# Patient Record
Sex: FEMALE | Race: WHITE | NOT HISPANIC OR LATINO | Employment: UNEMPLOYED | RURAL
[De-identification: names, ages, dates, MRNs, and addresses within clinical notes are randomized per-mention and may not be internally consistent; named-entity substitution may affect disease eponyms.]

---

## 2017-10-17 LAB
BCS RECOMMENDATION EXT: NORMAL
HM MAMMOGRAM: NORMAL

## 2017-10-19 LAB — PAP RECOMMENDATION EXT: NORMAL

## 2020-07-21 ENCOUNTER — HISTORICAL (OUTPATIENT)
Dept: ADMINISTRATIVE | Facility: HOSPITAL | Age: 55
End: 2020-07-21

## 2020-07-21 LAB — SARS-COV+SARS-COV-2 AG RESP QL IA.RAPID: NEGATIVE

## 2021-08-13 RX ORDER — NITROFURANTOIN 25; 75 MG/1; MG/1
100 CAPSULE ORAL 2 TIMES DAILY
Qty: 10 CAPSULE | Refills: 0 | Status: SHIPPED | OUTPATIENT
Start: 2021-08-13 | End: 2021-08-30 | Stop reason: ALTCHOICE

## 2021-08-30 ENCOUNTER — OFFICE VISIT (OUTPATIENT)
Dept: FAMILY MEDICINE | Facility: CLINIC | Age: 56
End: 2021-08-30
Payer: COMMERCIAL

## 2021-08-30 VITALS
TEMPERATURE: 97 F | DIASTOLIC BLOOD PRESSURE: 90 MMHG | OXYGEN SATURATION: 97 % | HEART RATE: 96 BPM | RESPIRATION RATE: 18 BRPM | WEIGHT: 238 LBS | SYSTOLIC BLOOD PRESSURE: 120 MMHG

## 2021-08-30 DIAGNOSIS — I10 ESSENTIAL HYPERTENSION, BENIGN: ICD-10-CM

## 2021-08-30 DIAGNOSIS — N39.0 URINARY TRACT INFECTION WITHOUT HEMATURIA, SITE UNSPECIFIED: Primary | ICD-10-CM

## 2021-08-30 DIAGNOSIS — R30.0 DYSURIA: ICD-10-CM

## 2021-08-30 LAB
BILIRUB SERPL-MCNC: ABNORMAL MG/DL
BLOOD URINE, POC: ABNORMAL
COLOR, POC UA: ABNORMAL
GLUCOSE UR QL STRIP: ABNORMAL
KETONES UR QL STRIP: ABNORMAL
LEUKOCYTE ESTERASE URINE, POC: ABNORMAL
NITRITE, POC UA: POSITIVE
PH, POC UA: 6
PROTEIN, POC: ABNORMAL
SPECIFIC GRAVITY, POC UA: 1.02
UROBILINOGEN, POC UA: 0.2

## 2021-08-30 PROCEDURE — 81003 URINALYSIS AUTO W/O SCOPE: CPT | Mod: QW,,, | Performed by: NURSE PRACTITIONER

## 2021-08-30 PROCEDURE — 81003 POCT URINALYSIS W/O SCOPE: ICD-10-PCS | Mod: QW,,, | Performed by: NURSE PRACTITIONER

## 2021-08-30 PROCEDURE — 87077 CULTURE, URINE: ICD-10-PCS | Mod: ,,, | Performed by: CLINICAL MEDICAL LABORATORY

## 2021-08-30 PROCEDURE — 99213 OFFICE O/P EST LOW 20 MIN: CPT | Mod: ,,, | Performed by: NURSE PRACTITIONER

## 2021-08-30 PROCEDURE — 87086 URINE CULTURE/COLONY COUNT: CPT | Mod: ,,, | Performed by: CLINICAL MEDICAL LABORATORY

## 2021-08-30 PROCEDURE — 87186 SC STD MICRODIL/AGAR DIL: CPT | Mod: ,,, | Performed by: CLINICAL MEDICAL LABORATORY

## 2021-08-30 PROCEDURE — 99213 PR OFFICE/OUTPT VISIT, EST, LEVL III, 20-29 MIN: ICD-10-PCS | Mod: ,,, | Performed by: NURSE PRACTITIONER

## 2021-08-30 PROCEDURE — 87086 CULTURE, URINE: ICD-10-PCS | Mod: ,,, | Performed by: CLINICAL MEDICAL LABORATORY

## 2021-08-30 PROCEDURE — 87077 CULTURE AEROBIC IDENTIFY: CPT | Mod: ,,, | Performed by: CLINICAL MEDICAL LABORATORY

## 2021-08-30 PROCEDURE — 87186 CULTURE, URINE: ICD-10-PCS | Mod: ,,, | Performed by: CLINICAL MEDICAL LABORATORY

## 2021-08-30 RX ORDER — FLUCONAZOLE 150 MG/1
150 TABLET ORAL DAILY
Qty: 3 TABLET | Refills: 0 | Status: SHIPPED | OUTPATIENT
Start: 2021-08-30 | End: 2021-08-31

## 2021-08-30 RX ORDER — CIPROFLOXACIN 500 MG/1
500 TABLET ORAL EVERY 12 HOURS
Qty: 10 TABLET | Refills: 0 | Status: SHIPPED | OUTPATIENT
Start: 2021-08-30 | End: 2022-05-10 | Stop reason: ALTCHOICE

## 2021-08-30 RX ORDER — PHENAZOPYRIDINE HYDROCHLORIDE 200 MG/1
200 TABLET, FILM COATED ORAL 3 TIMES DAILY PRN
Qty: 30 TABLET | Refills: 0 | Status: SHIPPED | OUTPATIENT
Start: 2021-08-30 | End: 2021-09-09

## 2021-09-01 LAB — UA COMPLETE W REFLEX CULTURE PNL UR: ABNORMAL

## 2022-05-10 ENCOUNTER — OFFICE VISIT (OUTPATIENT)
Dept: FAMILY MEDICINE | Facility: CLINIC | Age: 57
End: 2022-05-10
Payer: COMMERCIAL

## 2022-05-10 VITALS
TEMPERATURE: 98 F | SYSTOLIC BLOOD PRESSURE: 140 MMHG | BODY MASS INDEX: 36.46 KG/M2 | HEART RATE: 81 BPM | OXYGEN SATURATION: 96 % | RESPIRATION RATE: 18 BRPM | HEIGHT: 69 IN | WEIGHT: 246.19 LBS | DIASTOLIC BLOOD PRESSURE: 90 MMHG

## 2022-05-10 DIAGNOSIS — I10 ESSENTIAL HYPERTENSION, BENIGN: ICD-10-CM

## 2022-05-10 DIAGNOSIS — H81.11 VERTIGO, BENIGN PAROXYSMAL, RIGHT: Primary | ICD-10-CM

## 2022-05-10 PROCEDURE — 1160F PR REVIEW ALL MEDS BY PRESCRIBER/CLIN PHARMACIST DOCUMENTED: ICD-10-PCS | Mod: CPTII,,, | Performed by: NURSE PRACTITIONER

## 2022-05-10 PROCEDURE — 3080F PR MOST RECENT DIASTOLIC BLOOD PRESSURE >= 90 MM HG: ICD-10-PCS | Mod: CPTII,,, | Performed by: NURSE PRACTITIONER

## 2022-05-10 PROCEDURE — 96372 PR INJECTION,THERAP/PROPH/DIAG2ST, IM OR SUBCUT: ICD-10-PCS | Mod: ,,, | Performed by: NURSE PRACTITIONER

## 2022-05-10 PROCEDURE — 1159F PR MEDICATION LIST DOCUMENTED IN MEDICAL RECORD: ICD-10-PCS | Mod: CPTII,,, | Performed by: NURSE PRACTITIONER

## 2022-05-10 PROCEDURE — 3077F SYST BP >= 140 MM HG: CPT | Mod: CPTII,,, | Performed by: NURSE PRACTITIONER

## 2022-05-10 PROCEDURE — 1160F RVW MEDS BY RX/DR IN RCRD: CPT | Mod: CPTII,,, | Performed by: NURSE PRACTITIONER

## 2022-05-10 PROCEDURE — 4010F ACE/ARB THERAPY RXD/TAKEN: CPT | Mod: CPTII,,, | Performed by: NURSE PRACTITIONER

## 2022-05-10 PROCEDURE — 3008F PR BODY MASS INDEX (BMI) DOCUMENTED: ICD-10-PCS | Mod: CPTII,,, | Performed by: NURSE PRACTITIONER

## 2022-05-10 PROCEDURE — 3077F PR MOST RECENT SYSTOLIC BLOOD PRESSURE >= 140 MM HG: ICD-10-PCS | Mod: CPTII,,, | Performed by: NURSE PRACTITIONER

## 2022-05-10 PROCEDURE — 99213 OFFICE O/P EST LOW 20 MIN: CPT | Mod: 25,,, | Performed by: NURSE PRACTITIONER

## 2022-05-10 PROCEDURE — 4010F PR ACE/ARB THEARPY RXD/TAKEN: ICD-10-PCS | Mod: CPTII,,, | Performed by: NURSE PRACTITIONER

## 2022-05-10 PROCEDURE — 99213 PR OFFICE/OUTPT VISIT, EST, LEVL III, 20-29 MIN: ICD-10-PCS | Mod: 25,,, | Performed by: NURSE PRACTITIONER

## 2022-05-10 PROCEDURE — 3008F BODY MASS INDEX DOCD: CPT | Mod: CPTII,,, | Performed by: NURSE PRACTITIONER

## 2022-05-10 PROCEDURE — 3080F DIAST BP >= 90 MM HG: CPT | Mod: CPTII,,, | Performed by: NURSE PRACTITIONER

## 2022-05-10 PROCEDURE — 96372 THER/PROPH/DIAG INJ SC/IM: CPT | Mod: ,,, | Performed by: NURSE PRACTITIONER

## 2022-05-10 PROCEDURE — 1159F MED LIST DOCD IN RCRD: CPT | Mod: CPTII,,, | Performed by: NURSE PRACTITIONER

## 2022-05-10 RX ORDER — TELMISARTAN 20 MG/1
20 TABLET ORAL DAILY
Qty: 30 TABLET | Refills: 0 | Status: SHIPPED | OUTPATIENT
Start: 2022-05-10 | End: 2022-09-02

## 2022-05-10 RX ORDER — FLUTICASONE PROPIONATE 50 MCG
1 SPRAY, SUSPENSION (ML) NASAL 2 TIMES DAILY
Qty: 9.9 ML | Refills: 0 | Status: SHIPPED | OUTPATIENT
Start: 2022-05-10 | End: 2023-12-20

## 2022-05-10 RX ORDER — OMEPRAZOLE 20 MG/1
20 CAPSULE, DELAYED RELEASE ORAL DAILY
COMMUNITY

## 2022-05-10 RX ORDER — DEXAMETHASONE SODIUM PHOSPHATE 4 MG/ML
4 INJECTION, SOLUTION INTRA-ARTICULAR; INTRALESIONAL; INTRAMUSCULAR; INTRAVENOUS; SOFT TISSUE
Status: COMPLETED | OUTPATIENT
Start: 2022-05-10 | End: 2022-05-10

## 2022-05-10 RX ORDER — PREDNISONE 10 MG/1
TABLET ORAL
Qty: 18 TABLET | Refills: 0 | Status: SHIPPED | OUTPATIENT
Start: 2022-05-10 | End: 2022-05-19

## 2022-05-10 RX ADMIN — DEXAMETHASONE SODIUM PHOSPHATE 4 MG: 4 INJECTION, SOLUTION INTRA-ARTICULAR; INTRALESIONAL; INTRAMUSCULAR; INTRAVENOUS; SOFT TISSUE at 10:05

## 2022-05-10 NOTE — PROGRESS NOTES
KELLY Wren   [facility]  [address]  [place] [address]     PATIENT NAME: Isamar Osorio  : 1965  DATE: 5/10/22  MRN: [id number]      Billing Provider: KELLY Wren  Level of Service: [place] OFFICE/OUTPT VISIT, EST, LEVL III, 20-29 MIN  Patient PCP Information     Provider PCP Type    KELLY Wren General          Reason for Visit / Chief Complaint: Ear Fullness (Right ear worse than left for the last 3 weeks.  If she sleeps on the right side, it feels clogged and moisture will be inside ear.  Has experienced some dizziness if she gets up to fast and had some nausea.)       Update PCP  Update Chief Complaint         History of Present Illness / Problem Focused Workflow     Isamar Osorio presents to the clinic c/o ears feeling full lamonte the right for the past 3 weeks and has had dizziness at times when she gets up quick.      Review of Systems     Review of Systems   Constitutional: Negative.  Negative for activity change, appetite change, chills, fatigue and fever.   HENT: Positive for ear discharge. Negative for nasal congestion, ear pain, postnasal drip, sinus pressure/congestion, sneezing, sore throat and trouble swallowing.         Right ear feels stopped up   Eyes: Negative for visual disturbance.   Respiratory: Negative for cough, chest tightness and shortness of breath.    Cardiovascular: Negative for chest pain, palpitations and leg swelling.   Gastrointestinal: Negative for abdominal pain, blood in stool, change in bowel habit, nausea, vomiting and change in bowel habit.   Endocrine: Negative for cold intolerance, heat intolerance, polydipsia, polyphagia and polyuria.   Genitourinary: Negative for frequency and urgency.   Musculoskeletal: Negative for neck pain.   Neurological: Positive for dizziness. Negative for weakness, numbness and headaches.        Medical / Social / Family History   History reviewed. No pertinent past medical history.    Past Surgical  "History:   Procedure Laterality Date    ANKLE SURGERY Left      SECTION      CHOLECYSTECTOMY         Social History  Ms.  reports that she has never smoked. She has never used smokeless tobacco. She reports that she does not drink alcohol and does not use drugs.    Family History  Ms.'s family history includes No Known Problems in her father and mother.    Medications and Allergies     Medications  Outpatient Medications Marked as Taking for the 5/10/22 encounter (Office Visit) with KELLY Goldstein   Medication Sig Dispense Refill    omeprazole (PRILOSEC) 20 MG capsule Take 20 mg by mouth once daily.       Current Facility-Administered Medications for the 5/10/22 encounter (Office Visit) with KELLY Goldstein   Medication Dose Route Frequency Provider Last Rate Last Admin    [COMPLETED] dexamethasone injection 4 mg  4 mg Intramuscular 1 time in Clinic/HOD KELLY Goldstein   4 mg at 05/10/22 1009       Allergies  Review of patient's allergies indicates:  No Known Allergies    Physical Examination     Vitals:    05/10/22 0930 05/10/22 1039   BP: (!) 144/100 (!) 140/90   BP Location: Right arm    Patient Position: Sitting    BP Method: Large (Manual)    Pulse: 81    Resp: 18    Temp: 98.2 °F (36.8 °C)    TempSrc: Oral    SpO2: 96%    Weight: 111.7 kg (246 lb 3.2 oz)    Height: 5' 8.5" (1.74 m)       Physical Exam  Constitutional:       General: She is not in acute distress.     Appearance: Normal appearance.   HENT:      Right Ear: No tenderness. A middle ear effusion is present. There is no impacted cerumen. Tympanic membrane is not injected, perforated, erythematous, retracted or bulging.      Left Ear: No tenderness. A middle ear effusion is present. There is no impacted cerumen. Tympanic membrane is not injected, erythematous, retracted or bulging.      Ears:      Comments: Moderate amt of clear fluid behind TM right ear and small amt behind left TM     Nose: No congestion or " rhinorrhea.      Right Turbinates: Not swollen.      Left Turbinates: Not swollen.      Right Sinus: No maxillary sinus tenderness.      Left Sinus: No maxillary sinus tenderness.      Mouth/Throat:      Mouth: Mucous membranes are moist.      Pharynx: Oropharynx is clear.   Eyes:      General: No visual field deficit.     Extraocular Movements: Extraocular movements intact.      Right eye: No nystagmus.      Left eye: No nystagmus.      Conjunctiva/sclera: Conjunctivae normal.   Neck:      Thyroid: No thyromegaly.      Vascular: No carotid bruit.   Cardiovascular:      Rate and Rhythm: Normal rate and regular rhythm.      Pulses: Normal pulses.      Heart sounds: Normal heart sounds. No murmur heard.  Pulmonary:      Effort: Pulmonary effort is normal. No accessory muscle usage or respiratory distress.      Breath sounds: Normal breath sounds. No wheezing, rhonchi or rales.   Abdominal:      Palpations: Abdomen is soft.   Musculoskeletal:         General: Normal range of motion.      Cervical back: Neck supple.   Lymphadenopathy:      Cervical: Cervical adenopathy present.   Skin:     General: Skin is warm and dry.      Coloration: Skin is not jaundiced or pale.   Neurological:      Mental Status: She is alert and oriented to person, place, and time.      Cranial Nerves: No facial asymmetry.      Coordination: Coordination is intact.      Gait: Gait is intact.          Assessment and Plan (including Health Maintenance)      Problem List  Smart Sets  Document Outside HM   :        Health Maintenance Due   Topic Date Due    Cervical Cancer Screening  Never done    Lipid Panel  Never done    COVID-19 Vaccine (1) Never done    TETANUS VACCINE  Never done    Mammogram  Never done    Colorectal Cancer Screening  Never done    Shingles Vaccine (1 of 2) Never done       Problem List Items Addressed This Visit    None     Visit Diagnoses     Vertigo, benign paroxysmal, right    -  Primary    Will treat with decadron  injection along with prednisone taper and flonase nasal spray twice daily.    Relevant Medications    fluticasone propionate (FLONASE) 50 mcg/actuation nasal spray    predniSONE (DELTASONE) 10 MG tablet    dexamethasone injection 4 mg (Completed)    Essential hypertension, benign        Will restart telmisartan until pt can make an appt with her cardiologist.    Relevant Medications    telmisartan (MICARDIS) 20 MG Tab        Vertigo, benign paroxysmal, right  Comments:  Will treat with decadron injection along with prednisone taper and flonase nasal spray twice daily.  Orders:  -     fluticasone propionate (FLONASE) 50 mcg/actuation nasal spray; 1 spray (50 mcg total) by Each Nostril route 2 (two) times a day.  Dispense: 9.9 mL; Refill: 0  -     predniSONE (DELTASONE) 10 MG tablet; Take 1 tablet (10 mg total) by mouth 3 (three) times daily for 3 days, THEN 1 tablet (10 mg total) 2 (two) times daily for 3 days, THEN 1 tablet (10 mg total) once daily for 3 days.  Dispense: 18 tablet; Refill: 0  -     dexamethasone injection 4 mg    Essential hypertension, benign  Comments:  Will restart telmisartan until pt can make an appt with her cardiologist.  Orders:  -     telmisartan (MICARDIS) 20 MG Tab; Take 1 tablet (20 mg total) by mouth once daily.  Dispense: 30 tablet; Refill: 0       Health Maintenance Topics with due status: Not Due       Topic Last Completion Date    Influenza Vaccine Not Due       Procedures         Follow up in about 1 week (around 5/17/2022), or if symptoms worsen or fail to improve.     Signature:  KELLY Wren    Date of encounter: 5/10/22

## 2022-09-02 DIAGNOSIS — I10 ESSENTIAL HYPERTENSION, BENIGN: ICD-10-CM

## 2022-09-02 DIAGNOSIS — I10 ESSENTIAL HYPERTENSION, BENIGN: Primary | ICD-10-CM

## 2022-09-02 RX ORDER — TELMISARTAN 20 MG/1
TABLET ORAL
Qty: 30 TABLET | Refills: 0 | Status: SHIPPED | OUTPATIENT
Start: 2022-09-02 | End: 2022-12-15 | Stop reason: SDUPTHER

## 2022-09-02 RX ORDER — TELMISARTAN 20 MG/1
20 TABLET ORAL DAILY
Qty: 30 TABLET | Refills: 0 | Status: CANCELLED | OUTPATIENT
Start: 2022-09-02 | End: 2022-10-02

## 2022-12-15 ENCOUNTER — OFFICE VISIT (OUTPATIENT)
Dept: FAMILY MEDICINE | Facility: CLINIC | Age: 57
End: 2022-12-15
Payer: COMMERCIAL

## 2022-12-15 VITALS
TEMPERATURE: 98 F | BODY MASS INDEX: 36.29 KG/M2 | HEART RATE: 91 BPM | HEIGHT: 69 IN | OXYGEN SATURATION: 98 % | RESPIRATION RATE: 18 BRPM | SYSTOLIC BLOOD PRESSURE: 138 MMHG | WEIGHT: 245 LBS | DIASTOLIC BLOOD PRESSURE: 90 MMHG

## 2022-12-15 DIAGNOSIS — I10 ESSENTIAL HYPERTENSION, BENIGN: ICD-10-CM

## 2022-12-15 DIAGNOSIS — J02.9 PHARYNGITIS, UNSPECIFIED ETIOLOGY: Primary | ICD-10-CM

## 2022-12-15 PROCEDURE — 4010F ACE/ARB THERAPY RXD/TAKEN: CPT | Mod: CPTII,,, | Performed by: NURSE PRACTITIONER

## 2022-12-15 PROCEDURE — 99213 PR OFFICE/OUTPT VISIT, EST, LEVL III, 20-29 MIN: ICD-10-PCS | Mod: 25,,, | Performed by: NURSE PRACTITIONER

## 2022-12-15 PROCEDURE — 3008F PR BODY MASS INDEX (BMI) DOCUMENTED: ICD-10-PCS | Mod: CPTII,,, | Performed by: NURSE PRACTITIONER

## 2022-12-15 PROCEDURE — 4010F PR ACE/ARB THEARPY RXD/TAKEN: ICD-10-PCS | Mod: CPTII,,, | Performed by: NURSE PRACTITIONER

## 2022-12-15 PROCEDURE — 1160F PR REVIEW ALL MEDS BY PRESCRIBER/CLIN PHARMACIST DOCUMENTED: ICD-10-PCS | Mod: CPTII,,, | Performed by: NURSE PRACTITIONER

## 2022-12-15 PROCEDURE — 1159F PR MEDICATION LIST DOCUMENTED IN MEDICAL RECORD: ICD-10-PCS | Mod: CPTII,,, | Performed by: NURSE PRACTITIONER

## 2022-12-15 PROCEDURE — 1160F RVW MEDS BY RX/DR IN RCRD: CPT | Mod: CPTII,,, | Performed by: NURSE PRACTITIONER

## 2022-12-15 PROCEDURE — 1159F MED LIST DOCD IN RCRD: CPT | Mod: CPTII,,, | Performed by: NURSE PRACTITIONER

## 2022-12-15 PROCEDURE — 3080F PR MOST RECENT DIASTOLIC BLOOD PRESSURE >= 90 MM HG: ICD-10-PCS | Mod: CPTII,,, | Performed by: NURSE PRACTITIONER

## 2022-12-15 PROCEDURE — 96372 THER/PROPH/DIAG INJ SC/IM: CPT | Mod: ,,, | Performed by: NURSE PRACTITIONER

## 2022-12-15 PROCEDURE — 99213 OFFICE O/P EST LOW 20 MIN: CPT | Mod: 25,,, | Performed by: NURSE PRACTITIONER

## 2022-12-15 PROCEDURE — 3075F SYST BP GE 130 - 139MM HG: CPT | Mod: CPTII,,, | Performed by: NURSE PRACTITIONER

## 2022-12-15 PROCEDURE — 3080F DIAST BP >= 90 MM HG: CPT | Mod: CPTII,,, | Performed by: NURSE PRACTITIONER

## 2022-12-15 PROCEDURE — 96372 PR INJECTION,THERAP/PROPH/DIAG2ST, IM OR SUBCUT: ICD-10-PCS | Mod: ,,, | Performed by: NURSE PRACTITIONER

## 2022-12-15 PROCEDURE — 3075F PR MOST RECENT SYSTOLIC BLOOD PRESS GE 130-139MM HG: ICD-10-PCS | Mod: CPTII,,, | Performed by: NURSE PRACTITIONER

## 2022-12-15 PROCEDURE — 3008F BODY MASS INDEX DOCD: CPT | Mod: CPTII,,, | Performed by: NURSE PRACTITIONER

## 2022-12-15 RX ORDER — TELMISARTAN 20 MG/1
20 TABLET ORAL DAILY
Qty: 30 TABLET | Refills: 3 | Status: SHIPPED | OUTPATIENT
Start: 2022-12-15 | End: 2023-05-03

## 2022-12-15 NOTE — ASSESSMENT & PLAN NOTE
Declines swab. Bicillin LA given IM in clinic.   Ed a Hist as needed for nasal congestion and drainage.      Increase fluids and rest, alternate OTC Tylenol/Motrin for fever/pain, and to follow up if discussed worsening symptoms occur in next 2-3 days for further evaluation. May use warm, salt gargles, honey lemon drops, etc as needed. Patient verbalized understanding of treatment plan and denies any questions.

## 2022-12-15 NOTE — ASSESSMENT & PLAN NOTE
BP elevated today. Patient admits she has not been taking Micardis. Resume Micardis. Follow up with Cardiology as scheduled.

## 2022-12-15 NOTE — PROGRESS NOTES
Danielle Harkins NP   [facility]  [address]  [place]  [address]      PATIENT NAME: Isamar Osorio  : 1965  DATE: 12/15/22  MRN: [id number]      Billing Provider: Danielle Harkins NP  Level of Service: ME OFFICE/OUTPT VISIT, EST, LEVL III, 20-29 MIN  Patient PCP Information       Provider PCP Type    KELLY Wren General            Reason for Visit / Chief Complaint: Sore Throat (X 1 day)       Update PCP  Update Chief Complaint         History of Present Illness / Problem Focused Workflow     Isamar Osorio presents to the clinic with Sore Throat (X 1 day)     57 year old female presents to clinic with complaints of sore throat that started yesterday. She states she has had a little sinus drainage and congestion and mild cough. Denies fever. Denies known sick contacts. She states she has strep often. Denies other complaints at today's visit.       Review of Systems     @Review of Systems   Constitutional:  Negative for activity change, appetite change, fatigue and fever.   HENT:  Positive for nasal congestion, rhinorrhea, sinus pressure/congestion and sore throat. Negative for ear pain.    Eyes:  Negative for pain, redness, visual disturbance and eye dryness.   Respiratory:  Positive for cough. Negative for shortness of breath.    Cardiovascular:  Negative for chest pain and leg swelling.   Gastrointestinal:  Negative for abdominal distention, abdominal pain, constipation and diarrhea.   Endocrine: Negative for cold intolerance, heat intolerance and polyuria.   Genitourinary:  Negative for bladder incontinence, dysuria, frequency and urgency.   Musculoskeletal:  Negative for arthralgias, gait problem and myalgias.   Integumentary:  Negative for color change, rash and wound.   Allergic/Immunologic: Negative for environmental allergies and food allergies.   Neurological:  Negative for dizziness, weakness, light-headedness and headaches.   Psychiatric/Behavioral:  Negative for  behavioral problems and sleep disturbance.      Medical / Social / Family History   History reviewed. No pertinent past medical history.    Past Surgical History:   Procedure Laterality Date    ANKLE SURGERY Left      SECTION      CHOLECYSTECTOMY      PARTIAL HYSTERECTOMY         Social History  Ms.  reports that she has never smoked. She has never been exposed to tobacco smoke. She has never used smokeless tobacco. She reports that she does not drink alcohol and does not use drugs.    Family History  Ms.'s family history includes No Known Problems in her father and mother.    Medications and Allergies     Medications  No outpatient medications have been marked as taking for the 12/15/22 encounter (Office Visit) with Danielle Harkins NP.     Current Facility-Administered Medications for the 12/15/22 encounter (Office Visit) with Danielle Harkins NP   Medication Dose Route Frequency Provider Last Rate Last Admin    [COMPLETED] penicillin G benzathine (BICILLIN LA) injection 1.2 Million Units  1.2 Million Units Intramuscular Once Danielle Harkins NP   1.2 Million Units at 12/15/22 1005    [DISCONTINUED] penicillin G procaine-penicillin G benzathine (BICILLIN-CR) injection 1.2 Million Units  1.2 Million Units Intramuscular Once Danielle Harkins NP           Allergies  Review of patient's allergies indicates:  No Known Allergies    Physical Examination     Vitals:    12/15/22 0915   BP: (!) 138/90   Pulse:    Resp:    Temp:      Physical Exam  Vitals and nursing note reviewed.   Constitutional:       Appearance: Normal appearance.   HENT:      Head: Normocephalic.      Right Ear: Tympanic membrane normal.      Left Ear: Tympanic membrane normal.      Nose: Congestion present.      Right Turbinates: Pale.      Mouth/Throat:      Lips: Pink.      Mouth: Mucous membranes are moist.      Pharynx: Oropharyngeal exudate (clear post nasal drainage.) and posterior oropharyngeal erythema present.      Tonsils:  Tonsillar exudate present. 2+ on the right. 2+ on the left.   Eyes:      Conjunctiva/sclera: Conjunctivae normal.   Cardiovascular:      Rate and Rhythm: Normal rate and regular rhythm.      Pulses: Normal pulses.      Heart sounds: Normal heart sounds.   Pulmonary:      Effort: Pulmonary effort is normal.      Breath sounds: Normal breath sounds. No wheezing or rhonchi.   Abdominal:      General: Abdomen is flat. Bowel sounds are normal. There is no distension.      Palpations: Abdomen is soft.      Tenderness: There is no abdominal tenderness.   Musculoskeletal:         General: Normal range of motion.      Cervical back: Normal range of motion.   Lymphadenopathy:      Cervical: Cervical adenopathy present.   Skin:     General: Skin is warm and dry.      Capillary Refill: Capillary refill takes less than 2 seconds.   Neurological:      General: No focal deficit present.      Mental Status: She is alert and oriented to person, place, and time. Mental status is at baseline.   Psychiatric:         Mood and Affect: Mood normal.         Behavior: Behavior normal.             No results found for: WBC, HGB, HCT, MCV, PLT       No results found for: NA, K, CL, CO2, GLU, BUN, CREATININE, CALCIUM, PROT, ALBUMIN, BILITOT, ALKPHOS, AST, ALT, ANIONGAP, ESTGFRAFRICA, EGFRNONAA   No image results found.     Procedures   Assessment and Plan (including Health Maintenance)      Problem List  Smart Sets  Document Outside HM   :    Plan:           Problem List Items Addressed This Visit          ENT    Pharyngitis - Primary    Current Assessment & Plan     Declines swab. Bicillin LA given IM in clinic.   Ed a Hist as needed for nasal congestion and drainage.      Increase fluids and rest, alternate OTC Tylenol/Motrin for fever/pain, and to follow up if discussed worsening symptoms occur in next 2-3 days for further evaluation. May use warm, salt gargles, honey lemon drops, etc as needed. Patient verbalized understanding of treatment  plan and denies any questions.         Relevant Medications    chlorpheniramine-phenylephrine 4-10 mg per tablet    penicillin G benzathine (BICILLIN LA) injection 1.2 Million Units (Completed)       Cardiac/Vascular    Essential hypertension, benign    Overview     Will restart telmisartan until pt can make an appt with her cardiologist.         Current Assessment & Plan     BP elevated today. Patient admits she has not been taking Micardis. Resume Micardis. Follow up with Cardiology as scheduled.          Relevant Medications    telmisartan (MICARDIS) 20 MG Tab       Health Maintenance Topics with due status: Not Due       Topic Last Completion Date    Mammogram Not Due       No future appointments.     Health Maintenance Due   Topic Date Due    Cervical Cancer Screening  Never done    Lipid Panel  Never done    COVID-19 Vaccine (1) Never done    TETANUS VACCINE  Never done    Colorectal Cancer Screening  Never done    Shingles Vaccine (1 of 2) Never done        Follow up in about 4 weeks (around 1/12/2023), or if symptoms worsen or fail to improve.     Signature:  Danielle Harkins NP  [facility]  [address] [place]  [address]    Date of encounter: 12/15/22

## 2022-12-20 RX ORDER — AZITHROMYCIN 250 MG/1
TABLET, FILM COATED ORAL
Qty: 6 TABLET | Refills: 0 | Status: SHIPPED | OUTPATIENT
Start: 2022-12-20 | End: 2022-12-25

## 2023-01-30 ENCOUNTER — PATIENT OUTREACH (OUTPATIENT)
Dept: ADMINISTRATIVE | Facility: HOSPITAL | Age: 58
End: 2023-01-30

## 2023-05-03 ENCOUNTER — OFFICE VISIT (OUTPATIENT)
Dept: FAMILY MEDICINE | Facility: CLINIC | Age: 58
End: 2023-05-03
Payer: OTHER GOVERNMENT

## 2023-05-03 VITALS
TEMPERATURE: 98 F | DIASTOLIC BLOOD PRESSURE: 92 MMHG | HEART RATE: 84 BPM | RESPIRATION RATE: 17 BRPM | HEIGHT: 69 IN | SYSTOLIC BLOOD PRESSURE: 134 MMHG | BODY MASS INDEX: 36.33 KG/M2 | OXYGEN SATURATION: 99 % | WEIGHT: 245.31 LBS

## 2023-05-03 DIAGNOSIS — N12 PYELONEPHRITIS: Primary | ICD-10-CM

## 2023-05-03 DIAGNOSIS — I10 ESSENTIAL HYPERTENSION, BENIGN: ICD-10-CM

## 2023-05-03 PROCEDURE — 99213 PR OFFICE/OUTPT VISIT, EST, LEVL III, 20-29 MIN: ICD-10-PCS | Mod: ,,, | Performed by: NURSE PRACTITIONER

## 2023-05-03 PROCEDURE — 99213 OFFICE O/P EST LOW 20 MIN: CPT | Mod: ,,, | Performed by: NURSE PRACTITIONER

## 2023-05-03 RX ORDER — ONDANSETRON 4 MG/1
4 TABLET, ORALLY DISINTEGRATING ORAL
COMMUNITY
Start: 2023-04-26 | End: 2023-05-03

## 2023-05-03 RX ORDER — AMOXICILLIN AND CLAVULANATE POTASSIUM 875; 125 MG/1; MG/1
1 TABLET, FILM COATED ORAL
COMMUNITY
Start: 2023-04-29 | End: 2023-05-13

## 2023-05-03 RX ORDER — HYDROCODONE BITARTRATE AND ACETAMINOPHEN 5; 325 MG/1; MG/1
1 TABLET ORAL
COMMUNITY
Start: 2023-04-26 | End: 2023-05-06

## 2023-05-03 RX ORDER — FLUCONAZOLE 150 MG/1
TABLET ORAL
Qty: 2 TABLET | Refills: 2 | Status: SHIPPED | OUTPATIENT
Start: 2023-05-03 | End: 2023-12-20

## 2023-05-03 RX ORDER — TELMISARTAN 20 MG/1
20 TABLET ORAL DAILY
Qty: 30 TABLET | Refills: 3
Start: 2023-05-03 | End: 2023-07-17 | Stop reason: SDUPTHER

## 2023-06-07 ENCOUNTER — CLINICAL SUPPORT (OUTPATIENT)
Dept: FAMILY MEDICINE | Facility: CLINIC | Age: 58
End: 2023-06-07
Payer: OTHER GOVERNMENT

## 2023-06-07 VITALS — DIASTOLIC BLOOD PRESSURE: 88 MMHG | SYSTOLIC BLOOD PRESSURE: 134 MMHG

## 2023-06-12 DIAGNOSIS — N30.91 CYSTITIS WITH HEMATURIA: Primary | ICD-10-CM

## 2023-06-12 RX ORDER — CIPROFLOXACIN 500 MG/1
500 TABLET ORAL EVERY 12 HOURS
Qty: 14 TABLET | Refills: 0 | Status: SHIPPED | OUTPATIENT
Start: 2023-06-12 | End: 2023-11-01

## 2023-06-15 ENCOUNTER — TELEPHONE (OUTPATIENT)
Dept: FAMILY MEDICINE | Facility: CLINIC | Age: 58
End: 2023-06-15
Payer: OTHER GOVERNMENT

## 2023-06-16 NOTE — PROGRESS NOTES
Danielle Harkins NP   [facility]  [address]  [place]  [address]      PATIENT NAME: Isamar Osorio  : 1965  DATE: 5/3/23  MRN: [id number]      Billing Provider: Danielle Harkins NP  Level of Service: [place] OFFICE/OUTPT VISIT, EST, LEVL III, 20-29 MIN  Patient PCP Information       Provider PCP Type    Danielle Harkins NP General            Reason for Visit / Chief Complaint: Follow-up (Hospital follow up from [facility]. Discharged on . )       Update PCP  Update Chief Complaint         History of Present Illness / Problem Focused Workflow     Isamar Osorio presents to the clinic with Follow-up (Hospital follow up from [facility]. Discharged on . )     57 year old female presents to clinic for hospital follow up visit. She was discharged from [facility] on 23 where she was treated for Pyelonephritis. She originally went in on  to ER and was sent home on Cipro. However, when her blood cultures came back positive the next day she was contacted and instructed to return for admission. She was admitted on  and received IV Vanc and Zosyn. She was discharged home on  with instructions to take oral Augmentin x14 days. She states she is feeling much better and denies any problems. She was instructed to hold her Telmisartan at hospital MT due to hypotension. However, her blood pressure has been elevated at home so we will start her back on her Telmisartan today.        Review of Systems     Review of Systems   Constitutional:  Negative for activity change, appetite change, fatigue and fever.   HENT:  Negative for nasal congestion, ear pain, rhinorrhea, sinus pressure/congestion and sore throat.    Eyes:  Negative for pain, redness, visual disturbance and eye dryness.   Respiratory:  Negative for cough and shortness of breath.    Cardiovascular:  Negative for chest pain and leg swelling.   Gastrointestinal:  Negative for abdominal distention, abdominal pain,  constipation and diarrhea.   Endocrine: Negative for cold intolerance, heat intolerance and polyuria.   Genitourinary:  Negative for bladder incontinence, dysuria, frequency and urgency.   Musculoskeletal:  Negative for arthralgias, gait problem and myalgias.   Integumentary:  Negative for color change, rash and wound.   Allergic/Immunologic: Negative for environmental allergies and food allergies.   Neurological:  Negative for dizziness, weakness, light-headedness and headaches.   Psychiatric/Behavioral:  Negative for behavioral problems and sleep disturbance.      Medical / Social / Family History   History reviewed. No pertinent past medical history.    Past Surgical History:   Procedure Laterality Date    ANKLE SURGERY Left      SECTION      CHOLECYSTECTOMY      PARTIAL HYSTERECTOMY         Social History  Ms.  reports that she has never smoked. She has never been exposed to tobacco smoke. She has never used smokeless tobacco. She reports that she does not drink alcohol and does not use drugs.    Family History  Ms.'s family history includes No Known Problems in her father and mother.    Medications and Allergies     Medications  Outpatient Medications Marked as Taking for the 5/3/23 encounter (Office Visit) with Danielle Harkins NP   Medication Sig Dispense Refill    [] amoxicillin-clavulanate 875-125mg (AUGMENTIN) 875-125 mg per tablet Take 1 tablet by mouth.      [] ondansetron (ZOFRAN-ODT) 4 MG TbDL Take 4 mg by mouth.         Allergies  Review of patient's allergies indicates:  No Known Allergies    Physical Examination     Vitals:    23 1433   BP: (!) 134/92   Pulse: 84   Resp: 17   Temp: 97.7 °F (36.5 °C)     Physical Exam  Vitals and nursing note reviewed.   Constitutional:       Appearance: She is obese.   HENT:      Head: Normocephalic.      Right Ear: Tympanic membrane normal.      Left Ear: Tympanic membrane normal.      Nose: Nose normal.      Mouth/Throat:       Mouth: Mucous membranes are moist.      Pharynx: Oropharynx is clear. No posterior oropharyngeal erythema.   Eyes:      Conjunctiva/sclera: Conjunctivae normal.   Cardiovascular:      Rate and Rhythm: Normal rate and regular rhythm.      Pulses: Normal pulses.      Heart sounds: Normal heart sounds.   Pulmonary:      Effort: Pulmonary effort is normal.      Breath sounds: Normal breath sounds.   Abdominal:      General: Abdomen is flat. Bowel sounds are normal. There is no distension.      Palpations: Abdomen is soft.   Musculoskeletal:         General: No swelling or tenderness. Normal range of motion.      Cervical back: Normal range of motion.      Right lower leg: No edema.      Left lower leg: No edema.   Skin:     General: Skin is warm and dry.      Capillary Refill: Capillary refill takes less than 2 seconds.   Neurological:      Mental Status: She is alert. Mental status is at baseline.   Psychiatric:         Mood and Affect: Mood normal.         Behavior: Behavior normal.             No results found for: WBC, HGB, HCT, MCV, PLT       No results found for: NA, K, CL, CO2, GLU, BUN, CREATININE, CALCIUM, PROT, ALBUMIN, BILITOT, ALKPHOS, AST, ALT, ANIONGAP, ESTGFRAFRICA, EGFRNONAA   No image results found.     Procedures   No results found for: CHOL  No results found for: HDL  No results found for: LDLCALC  No results found for: TRIG  No results found for: CHOLHDL   No results found for: LABA1C, HGBA1C   No results found for: TSH, W3NTJSR, V0OFCUH, THYROIDAB, FREET4   Assessment and Plan (including Health Maintenance)      Problem List  Smart Sets  Document Outside HM   :    Plan:         Health Maintenance Due   Topic Date Due    Hepatitis C Screening  Never done    Lipid Panel  Never done    COVID-19 Vaccine (1) Never done    TETANUS VACCINE  Never done    Hemoglobin A1c (Diabetic Prevention Screening)  Never done    Colorectal Cancer Screening  Never done    Shingles Vaccine (1 of 2) Never done    Mammogram   10/17/2018    Cervical Cancer Screening  10/17/2020       Problem List Items Addressed This Visit          Cardiac/Vascular    Essential hypertension, benign    Current Assessment & Plan     Micardis was held at [facility] due to hypotension. However, since that time her blood pressure has been elevated. She has been checking at home with readings around 140/high 80s. We will restart her on Micardis today and she is to continue to monitor at home and keep log. Return for BP recheck in 2 weeks.             ID    Pyelonephritis - Primary    Current Assessment & Plan     Patient is still taking oral Augmentin as instructed at [facility]. Instructed her to return to clinic for recheck of urine after Augmentin complete.   Increase fluids. Follow up if symptoms worsen.             The patient has no Health Maintenance topics of status Not Due    No future appointments.     Follow up in about 2 weeks (around 5/17/2023).     Signature:  Danielle Harkins NP  [facility]  [address]  [place]  [address]    Date of encounter: 5/3/23  Reviewed the chart and agree with assessment and plan.  Amadou Walsh, DO

## 2023-06-19 PROBLEM — N12 PYELONEPHRITIS: Status: ACTIVE | Noted: 2023-06-19

## 2023-06-19 NOTE — ASSESSMENT & PLAN NOTE
Micardis was held at [facility] due to hypotension. However, since that time her blood pressure has been elevated. She has been checking at home with readings around 140/high 80s. We will restart her on Micardis today and she is to continue to monitor at home and keep log. Return for BP recheck in 2 weeks.

## 2023-06-19 NOTE — ASSESSMENT & PLAN NOTE
Patient Education     Ulnar Nerve Palsy  The ulnar nerve travels down the arm from the shoulder to the hand. It controls movement and feeling in the wrist and hand. Damage to this nerve can cause a condition called ulnar nerve palsy.  A common cause of ulnar nerve palsy is leaning on the elbow for long periods of time. Injury to the arm or elbow, such as a fracture, can also damage the ulnar nerve.  Symptoms of ulnar nerve palsy include:  · Numbness, tingling, or burning (often described as \"pins and needles\")  · Pain  · Weakness or muscle shrinking in the hand and fingers  The treatment depends on the cause of the nerve damage. In some cases, the problem will go away on its own once pressure to the nerve is relieved. Certain tests may be done to help determine the injury and extent of damage. These include nerve conduction studies (NCS) and electromyography (EMG). Splinting the wrist to limit movement may help with healing. If the problem is due to trauma or injury, physical therapy may be prescribed. Corticosteroid injections into the area may reduce swelling and pressure on the nerve. Surgery to repair the nerve may be needed for chronic symptoms that don't respond to simpler treatments.  Home care  · Rest the wrist and arm until normal feeling and strength return.  · If you were given a splint or sling, wear it as directed.  · Don't lean on your elbows.  · If medicines were prescribed for pain or nerve sensations, take them as directed.  Follow-up care  Follow up with your healthcare provider or as advised by our staff.  ·   Date Last Reviewed: 4/1/2018  © 4240-6591 The "[employer]. [address] [place] [address], [place] [address]. All rights reserved. This information is not intended as a substitute for professional medical care. Always follow your healthcare professional's instructions.      Take the medrol dose pack as directed with food   Ice or heat 4 times per day   Wear the ace wrap as much as needed  Patient is still taking oral Augmentin as instructed at hospital [place]. Instructed her to return to clinic for recheck of urine after Augmentin complete.   Increase fluids. Follow up if symptoms worsen.      Call if no improvement

## 2023-07-17 DIAGNOSIS — I10 ESSENTIAL HYPERTENSION, BENIGN: ICD-10-CM

## 2023-07-17 RX ORDER — TELMISARTAN 20 MG/1
20 TABLET ORAL DAILY
Qty: 30 TABLET | Refills: 1
Start: 2023-07-17 | End: 2023-07-21 | Stop reason: SDUPTHER

## 2023-07-21 ENCOUNTER — PATIENT MESSAGE (OUTPATIENT)
Dept: ADMINISTRATIVE | Facility: HOSPITAL | Age: 58
End: 2023-07-21

## 2023-07-21 DIAGNOSIS — I10 ESSENTIAL HYPERTENSION, BENIGN: ICD-10-CM

## 2023-07-21 RX ORDER — TELMISARTAN 20 MG/1
20 TABLET ORAL DAILY
Qty: 30 TABLET | Refills: 1 | Status: SHIPPED | OUTPATIENT
Start: 2023-07-21 | End: 2023-09-19

## 2023-07-25 ENCOUNTER — CLINICAL SUPPORT (OUTPATIENT)
Dept: FAMILY MEDICINE | Facility: CLINIC | Age: 58
End: 2023-07-25
Payer: OTHER GOVERNMENT

## 2023-07-25 VITALS — SYSTOLIC BLOOD PRESSURE: 126 MMHG | DIASTOLIC BLOOD PRESSURE: 84 MMHG

## 2023-07-25 DIAGNOSIS — R39.15 URINARY URGENCY: Primary | ICD-10-CM

## 2023-07-25 LAB
BILIRUB SERPL-MCNC: ABNORMAL MG/DL
BLOOD URINE, POC: ABNORMAL
COLOR, POC UA: YELLOW
GLUCOSE UR QL STRIP: NEGATIVE
KETONES UR QL STRIP: NEGATIVE
LEUKOCYTE ESTERASE URINE, POC: ABNORMAL
NITRITE, POC UA: NEGATIVE
PH, POC UA: 6
PROTEIN, POC: NEGATIVE
SPECIFIC GRAVITY, POC UA: 1.01
UROBILINOGEN, POC UA: 0.2

## 2023-07-25 PROCEDURE — 87186 CULTURE, URINE: ICD-10-PCS | Mod: ,,, | Performed by: CLINICAL MEDICAL LABORATORY

## 2023-07-25 PROCEDURE — 87077 CULTURE AEROBIC IDENTIFY: CPT | Mod: ,,, | Performed by: CLINICAL MEDICAL LABORATORY

## 2023-07-25 PROCEDURE — 81003 URINALYSIS AUTO W/O SCOPE: CPT | Mod: QW,,, | Performed by: NURSE PRACTITIONER

## 2023-07-25 PROCEDURE — 87186 SC STD MICRODIL/AGAR DIL: CPT | Mod: ,,, | Performed by: CLINICAL MEDICAL LABORATORY

## 2023-07-25 PROCEDURE — 87086 CULTURE, URINE: ICD-10-PCS | Mod: ,,, | Performed by: CLINICAL MEDICAL LABORATORY

## 2023-07-25 PROCEDURE — 87077 CULTURE, URINE: ICD-10-PCS | Mod: ,,, | Performed by: CLINICAL MEDICAL LABORATORY

## 2023-07-25 PROCEDURE — 87086 URINE CULTURE/COLONY COUNT: CPT | Mod: ,,, | Performed by: CLINICAL MEDICAL LABORATORY

## 2023-07-25 PROCEDURE — 81003 POCT URINALYSIS W/O SCOPE: ICD-10-PCS | Mod: QW,,, | Performed by: NURSE PRACTITIONER

## 2023-07-25 RX ORDER — PETROLATUM,WHITE/LANOLIN
1 OINTMENT (GRAM) TOPICAL DAILY
COMMUNITY

## 2023-07-25 RX ORDER — MELOXICAM 15 MG/1
15 TABLET ORAL DAILY
COMMUNITY
Start: 2023-06-27

## 2023-07-25 NOTE — PROGRESS NOTES
Pt presented to clinic for BP check and UA due to recurrent UTI symptoms. Pt states that 3 days ago she began having urinary urgency with only a small amount of urine when she voided her bladder, but her symptoms have improved in the last 2 days. UA abnormal, notified KELLY Angela.

## 2023-07-27 RX ORDER — AMOXICILLIN 500 MG/1
500 TABLET, FILM COATED ORAL EVERY 12 HOURS
Qty: 14 TABLET | Refills: 0 | Status: SHIPPED | OUTPATIENT
Start: 2023-07-27 | End: 2023-08-03

## 2023-07-27 NOTE — PROGRESS NOTES
Labs reviewed: Urine culture showed enterococcus species on early report. No sensitivity report yet but would like her to go ahead and get started on antibiotics so I have sent in Amoxicillin. Please contact her and let her know. Take all antibitiocs until complete and return in 1 week after completion to obtain UA and make sure urine has cleared.

## 2023-07-28 LAB — UA COMPLETE W REFLEX CULTURE PNL UR: ABNORMAL

## 2023-08-10 RX ORDER — FLUCONAZOLE 150 MG/1
TABLET ORAL
Qty: 2 TABLET | Refills: 2 | Status: SHIPPED | OUTPATIENT
Start: 2023-08-10 | End: 2023-12-20

## 2023-08-25 ENCOUNTER — CLINICAL SUPPORT (OUTPATIENT)
Dept: FAMILY MEDICINE | Facility: CLINIC | Age: 58
End: 2023-08-25
Payer: OTHER GOVERNMENT

## 2023-08-25 VITALS — DIASTOLIC BLOOD PRESSURE: 72 MMHG | SYSTOLIC BLOOD PRESSURE: 132 MMHG

## 2023-08-25 DIAGNOSIS — Z87.440 RECENT URINARY TRACT INFECTION: Primary | ICD-10-CM

## 2023-08-25 LAB
BILIRUB SERPL-MCNC: NEGATIVE MG/DL
BLOOD URINE, POC: ABNORMAL
COLOR, POC UA: YELLOW
GLUCOSE UR QL STRIP: NEGATIVE
KETONES UR QL STRIP: ABNORMAL
LEUKOCYTE ESTERASE URINE, POC: ABNORMAL
NITRITE, POC UA: NEGATIVE
PH, POC UA: 6
PROTEIN, POC: NEGATIVE
SPECIFIC GRAVITY, POC UA: 1.01
UROBILINOGEN, POC UA: 0.2

## 2023-08-25 PROCEDURE — 81003 POCT URINALYSIS W/O SCOPE: ICD-10-PCS | Mod: QW,,, | Performed by: NURSE PRACTITIONER

## 2023-08-25 PROCEDURE — 81003 URINALYSIS AUTO W/O SCOPE: CPT | Mod: QW,,, | Performed by: NURSE PRACTITIONER

## 2023-08-25 NOTE — PROGRESS NOTES
Patient came to clinic for recheck of urinalysis. She has completed amoxil on/ about 08/04/2023. She also had her blood pressure rechecked. She has an appointment with cardiologist on 09/15/2023. She is taking medications as directed. Patient reports having some urinary frequency with little production of urine x 3 days and has improved symptoms by early afternoon.  Will call patient with any further instructions and results of urinalysis.

## 2023-08-25 NOTE — PROGRESS NOTES
Labs reviewed: Moderate WBC and Trace blood which indicates some inflammation but not infection. Increase fluids, avoid known irritants (excessive sodas, spicy foods, etc). I would recommend she follows up with her urologist as I feel she may have some chronic inflammatory issues of the bladder.

## 2023-09-18 PROBLEM — N12 PYELONEPHRITIS: Status: RESOLVED | Noted: 2023-06-19 | Resolved: 2023-09-18

## 2023-11-01 ENCOUNTER — OFFICE VISIT (OUTPATIENT)
Dept: FAMILY MEDICINE | Facility: CLINIC | Age: 58
End: 2023-11-01
Payer: OTHER GOVERNMENT

## 2023-11-01 VITALS
WEIGHT: 244.38 LBS | RESPIRATION RATE: 19 BRPM | TEMPERATURE: 98 F | HEART RATE: 101 BPM | SYSTOLIC BLOOD PRESSURE: 122 MMHG | DIASTOLIC BLOOD PRESSURE: 94 MMHG | BODY MASS INDEX: 36.2 KG/M2 | OXYGEN SATURATION: 97 % | HEIGHT: 69 IN

## 2023-11-01 DIAGNOSIS — R30.0 DYSURIA: ICD-10-CM

## 2023-11-01 DIAGNOSIS — R30.0 DYSURIA: Primary | ICD-10-CM

## 2023-11-01 DIAGNOSIS — R10.9 FLANK PAIN: ICD-10-CM

## 2023-11-01 LAB
BILIRUB SERPL-MCNC: NEGATIVE MG/DL
BLOOD URINE, POC: NEGATIVE
COLOR, POC UA: YELLOW
GLUCOSE UR QL STRIP: NEGATIVE
KETONES UR QL STRIP: NEGA TIVE
LEUKOCYTE ESTERASE URINE, POC: NEGATIVE
NITRITE, POC UA: NEGATIVE
PH, POC UA: 6.5
PROTEIN, POC: NEGATIVE
SPECIFIC GRAVITY, POC UA: 1.02
UROBILINOGEN, POC UA: 0.2

## 2023-11-01 PROCEDURE — 96372 PR INJECTION,THERAP/PROPH/DIAG2ST, IM OR SUBCUT: ICD-10-PCS | Mod: ,,,

## 2023-11-01 PROCEDURE — 99214 OFFICE O/P EST MOD 30 MIN: CPT | Mod: 25,,,

## 2023-11-01 PROCEDURE — 99214 PR OFFICE/OUTPT VISIT, EST, LEVL IV, 30-39 MIN: ICD-10-PCS | Mod: 25,,,

## 2023-11-01 PROCEDURE — 81003 URINALYSIS AUTO W/O SCOPE: CPT | Mod: QW,,,

## 2023-11-01 PROCEDURE — 81003 POCT URINALYSIS W/O SCOPE: ICD-10-PCS | Mod: QW,,,

## 2023-11-01 PROCEDURE — 96372 THER/PROPH/DIAG INJ SC/IM: CPT | Mod: ,,,

## 2023-11-01 RX ORDER — SULFAMETHOXAZOLE AND TRIMETHOPRIM 800; 160 MG/1; MG/1
1 TABLET ORAL 2 TIMES DAILY
Qty: 14 TABLET | Refills: 0 | Status: SHIPPED | OUTPATIENT
Start: 2023-11-01 | End: 2023-11-01 | Stop reason: SDUPTHER

## 2023-11-01 RX ORDER — CEFTRIAXONE 1 G/1
1 INJECTION, POWDER, FOR SOLUTION INTRAMUSCULAR; INTRAVENOUS
Status: COMPLETED | OUTPATIENT
Start: 2023-11-01 | End: 2023-11-01

## 2023-11-01 RX ORDER — SULFAMETHOXAZOLE AND TRIMETHOPRIM 800; 160 MG/1; MG/1
1 TABLET ORAL 2 TIMES DAILY
Qty: 14 TABLET | Refills: 0 | Status: SHIPPED | OUTPATIENT
Start: 2023-11-01 | End: 2023-11-08

## 2023-11-01 RX ORDER — KETOROLAC TROMETHAMINE 30 MG/ML
60 INJECTION, SOLUTION INTRAMUSCULAR; INTRAVENOUS
Status: COMPLETED | OUTPATIENT
Start: 2023-11-01 | End: 2023-11-01

## 2023-11-01 RX ORDER — ROSUVASTATIN CALCIUM 20 MG/1
20 TABLET, COATED ORAL DAILY
COMMUNITY
Start: 2023-10-30

## 2023-11-01 RX ADMIN — CEFTRIAXONE 1 G: 1 INJECTION, POWDER, FOR SOLUTION INTRAMUSCULAR; INTRAVENOUS at 12:11

## 2023-11-01 RX ADMIN — KETOROLAC TROMETHAMINE 60 MG: 30 INJECTION, SOLUTION INTRAMUSCULAR; INTRAVENOUS at 12:11

## 2023-11-01 NOTE — PROGRESS NOTES
KELLY DE JESUS   [place] [address]  [place]  [address]      PATIENT NAME: Isamar Osorio  : 1965  DATE: 23  MRN: [id number]      Billing Provider: KELLY DE JESUS  Level of Service: IN OFFICE/OUTPT VISIT, EST, LEVL IV, 30-39 MIN  Patient PCP Information       Provider PCP Type    Danielle Harkins NP General            Reason for Visit / Chief Complaint: Back Pain (58 year old female presents with low mid line back pain. She has a history of blood in urine and kidney stones. She has not felt the pressure she normally feels when she gets a UTI. She goes back to urologist in December. She had some Cipro at home and took one pill last night.), Fever (Low grade fever onset last night at its highest it was 100.2*F. OTC Tylenol two extra strength taken for fever and discomfort.), and Headache (Reports headache. Onset two to three days. She does not know if this is related to other symptoms.)         History of Present Illness / Problem Focused Workflow     Isamar Osorio presents to the clinic with Back Pain (58 year old female presents with low mid line back pain. She has a history of blood in urine and kidney stones. She has not felt the pressure she normally feels when she gets a UTI. She goes back to urologist in December. She had some Cipro at home and took one pill last night.), Fever (Low grade fever onset last night at its highest it was 100.2*F. OTC Tylenol two extra strength taken for fever and discomfort.), and Headache (Reports headache. Onset two to three days. She does not know if this is related to other symptoms.)     59 y/o female presents to clinic with complaints of dysuria, mid to lower left back pain and low grade fever. States symptoms started a couple days ago and has not improved. Pt has hx of recurrent UTI's and hx of kidney stones. Pt has follow up visit with Urologist in [place] in December    Review of Systems     @Review of Systems    Constitutional:  Positive for fever. Negative for chills and fatigue.   HENT:  Negative for nasal congestion, ear pain, sinus pressure/congestion and sore throat.    Respiratory:  Negative for cough, chest tightness, shortness of breath and wheezing.    Cardiovascular:  Negative for chest pain and palpitations.   Gastrointestinal:  Negative for abdominal pain, nausea and vomiting.   Genitourinary:  Positive for dysuria, flank pain and frequency. Negative for hematuria and urgency.   Musculoskeletal:  Negative for myalgias.   Neurological:  Negative for dizziness, weakness, light-headedness and headaches.   Psychiatric/Behavioral:  Negative for suicidal ideas.        Medical / Social / Family History   History reviewed. No pertinent past medical history.    Past Surgical History:   Procedure Laterality Date    ANKLE SURGERY Left      SECTION      CHOLECYSTECTOMY      PARTIAL HYSTERECTOMY         Social History  Ms.  reports that she has never smoked. She has never been exposed to tobacco smoke. She has never used smokeless tobacco. She reports that she does not drink alcohol and does not use drugs.    Family History  Ms.'s family history includes No Known Problems in her father and mother.    Medications and Allergies     Medications  Outpatient Medications Marked as Taking for the 23 encounter (Office Visit) with Sanjiv Rodríguez FNP   Medication Sig Dispense Refill    glucosamine sulfate 750 mg Tab Take 1 tablet by mouth once daily.      Lactobacillus rhamnosus GG (CULTURELLE) 10 billion cell capsule Take 1 capsule by mouth once daily.      meloxicam (MOBIC) 15 MG tablet Take 15 mg by mouth once daily.      rosuvastatin (CRESTOR) 20 MG tablet Take 20 mg by mouth once daily.      [DISCONTINUED] ciprofloxacin HCl (CIPRO) 500 MG tablet Take 1 tablet (500 mg total) by mouth every 12 (twelve) hours. 14 tablet 0     Current Facility-Administered Medications for the 23 encounter (Office Visit) with  Sanjiv Rodríguez FNP   Medication Dose Route Frequency Provider Last Rate Last Admin    [COMPLETED] cefTRIAXone injection 1 g  1 g Intramuscular 1 time in Clinic/HOD RodríguezSanjiv faulkner KELLY   1 g at 11/01/23 1225    [COMPLETED] ketorolac injection 60 mg  60 mg Intramuscular 1 time in Clinic/HOD Sanjiv Rodríguez KELLY   60 mg at 11/01/23 1226       Allergies  Review of patient's allergies indicates:  No Known Allergies    Physical Examination     Vitals:    11/01/23 1100   BP: (!) 122/94   Pulse: 101   Resp: 19   Temp: 97.9 °F (36.6 °C)     Physical Exam  Vitals and nursing note reviewed.   Constitutional:       General: She is awake.      Appearance: Normal appearance. She is overweight.   HENT:      Head: Normocephalic.      Right Ear: Tympanic membrane and ear canal normal.      Left Ear: Tympanic membrane and ear canal normal.      Nose: Nose normal.      Mouth/Throat:      Lips: Pink.      Mouth: Mucous membranes are moist.      Pharynx: Oropharynx is clear. Uvula midline.   Eyes:      Conjunctiva/sclera: Conjunctivae normal.      Pupils: Pupils are equal, round, and reactive to light.   Cardiovascular:      Rate and Rhythm: Normal rate and regular rhythm.      Heart sounds: Normal heart sounds, S1 normal and S2 normal.   Pulmonary:      Effort: No respiratory distress.      Breath sounds: Normal breath sounds. No decreased breath sounds, wheezing, rhonchi or rales.   Abdominal:      General: Bowel sounds are normal.      Palpations: Abdomen is soft.      Tenderness: There is no abdominal tenderness.   Musculoskeletal:      Cervical back: Normal range of motion.      Thoracic back: Normal.      Lumbar back: Normal.   Skin:     General: Skin is warm.      Capillary Refill: Capillary refill takes less than 2 seconds.   Neurological:      Mental Status: She is alert and oriented to person, place, and time.   Psychiatric:         Attention and Perception: Attention normal.         Mood and Affect: Mood normal.          "Speech: Speech normal.         Behavior: Behavior normal. Behavior is cooperative.         Thought Content: Thought content does not include homicidal or suicidal ideation. Thought content does not include homicidal or suicidal plan.               No results found for: "WBC", "HGB", "HCT", "MCV", "PLT"     CMP  No results found for: "NA", "K", "CL", "CO2", "GLU", "BUN", "CREATININE", "CALCIUM", "PROT", "ALBUMIN", "BILITOT", "ALKPHOS", "AST", "ALT", "ANIONGAP", "EGFRNORACEVR"  Procedures   Assessment and Plan (including Health Maintenance)   :    Plan:           Problem List Items Addressed This Visit          Renal/    Dysuria - Primary    Current Assessment & Plan     UA obtained today and WNL. Urine culture will be sent and will call pt with results and any new orders. Pt has hx of recurrent UTI's, so I will treat today until culture returns. Rocephin IM today. Bactrim RX. Medication instructions and education given with understanding voiced. Increase fluids, cranberry juice, good hygiene. RTC with worsening, new or persistent symptoms.         Relevant Medications    cefTRIAXone injection 1 g (Completed)    sulfamethoxazole-trimethoprim 800-160mg (BACTRIM DS) 800-160 mg Tab    Other Relevant Orders    POCT URINALYSIS W/O SCOPE (Completed)    Urine culture       GI    Flank pain    Current Assessment & Plan     Hx of kidney stones. KUB today. Will call with results. Toradol IM today for back pain.          Relevant Medications    ketorolac injection 60 mg (Completed)    Other Relevant Orders    X-Ray KUB (Completed)       Health Maintenance Topics with due status: Not Due       Topic Last Completion Date    Cervical Cancer Screening 08/25/2022    Lipid Panel 08/25/2022       No future appointments.     Health Maintenance Due   Topic Date Due    Hepatitis C Screening  Never done    COVID-19 Vaccine (1) Never done    TETANUS VACCINE  Never done    Hemoglobin A1c (Diabetic Prevention Screening)  Never done    " Colorectal Cancer Screening  Never done    Shingles Vaccine (1 of 2) Never done    RSV Vaccine (Age 60+) (1 - 1-dose 60+ series) Never done    Mammogram  08/31/2023    Influenza Vaccine (1) 09/01/2023        Follow up if symptoms worsen or fail to improve.     Signature:  COREY CHARLES NEETU  [address] [place]  [address]    Date of encounter: 11/1/23

## 2023-11-01 NOTE — ASSESSMENT & PLAN NOTE
UA obtained today and WNL. Urine culture will be sent and will call pt with results and any new orders. Pt has hx of recurrent UTI's, so I will treat today until culture returns. Rocephin IM today. Bactrim RX. Medication instructions and education given with understanding voiced. Increase fluids, cranberry juice, good hygiene. RTC with worsening, new or persistent symptoms.

## 2023-11-01 NOTE — PROGRESS NOTES
Hepatitis C Screening  COVID-19 Vaccine (1)  TETANUS VACCINE  Hemoglobin A1c (Diabetic Prevention Screening)  5 more care gaps She declined each of these care gaps at this visit.

## 2023-12-20 ENCOUNTER — OFFICE VISIT (OUTPATIENT)
Dept: FAMILY MEDICINE | Facility: CLINIC | Age: 58
End: 2023-12-20
Payer: OTHER GOVERNMENT

## 2023-12-20 VITALS
OXYGEN SATURATION: 95 % | HEART RATE: 112 BPM | HEIGHT: 69 IN | WEIGHT: 248 LBS | DIASTOLIC BLOOD PRESSURE: 88 MMHG | RESPIRATION RATE: 18 BRPM | SYSTOLIC BLOOD PRESSURE: 138 MMHG | BODY MASS INDEX: 36.73 KG/M2 | TEMPERATURE: 100 F

## 2023-12-20 DIAGNOSIS — J01.00 ACUTE NON-RECURRENT MAXILLARY SINUSITIS: Primary | ICD-10-CM

## 2023-12-20 DIAGNOSIS — J02.9 SORE THROAT: ICD-10-CM

## 2023-12-20 DIAGNOSIS — R50.9 FEVER, UNSPECIFIED FEVER CAUSE: ICD-10-CM

## 2023-12-20 LAB
CTP QC/QA: YES
CTP QC/QA: YES
S PYO RRNA THROAT QL PROBE: NEGATIVE
SARS-COV-2 RDRP RESP QL NAA+PROBE: NEGATIVE

## 2023-12-20 PROCEDURE — 99213 OFFICE O/P EST LOW 20 MIN: CPT | Mod: 25,,,

## 2023-12-20 PROCEDURE — 87880 POCT RAPID STREP A: ICD-10-PCS | Mod: QW,,,

## 2023-12-20 PROCEDURE — 96372 THER/PROPH/DIAG INJ SC/IM: CPT | Mod: ,,,

## 2023-12-20 PROCEDURE — 87635 SARS-COV-2 COVID-19 AMP PRB: CPT | Mod: QW,,,

## 2023-12-20 PROCEDURE — 99213 PR OFFICE/OUTPT VISIT, EST, LEVL III, 20-29 MIN: ICD-10-PCS | Mod: 25,,,

## 2023-12-20 PROCEDURE — 87635: ICD-10-PCS | Mod: QW,,,

## 2023-12-20 PROCEDURE — 87880 STREP A ASSAY W/OPTIC: CPT | Mod: QW,,,

## 2023-12-20 PROCEDURE — 96372 PR INJECTION,THERAP/PROPH/DIAG2ST, IM OR SUBCUT: ICD-10-PCS | Mod: ,,,

## 2023-12-20 RX ORDER — DEXAMETHASONE SODIUM PHOSPHATE 4 MG/ML
4 INJECTION, SOLUTION INTRA-ARTICULAR; INTRALESIONAL; INTRAMUSCULAR; INTRAVENOUS; SOFT TISSUE
Status: COMPLETED | OUTPATIENT
Start: 2023-12-20 | End: 2023-12-20

## 2023-12-20 RX ORDER — DEXAMETHASONE SODIUM PHOSPHATE 4 MG/ML
4 INJECTION, SOLUTION INTRA-ARTICULAR; INTRALESIONAL; INTRAMUSCULAR; INTRAVENOUS; SOFT TISSUE
Status: CANCELLED | OUTPATIENT
Start: 2023-12-20 | End: 2023-12-20

## 2023-12-20 RX ORDER — PREDNISONE 20 MG/1
20 TABLET ORAL 2 TIMES DAILY
Qty: 6 TABLET | Refills: 0 | Status: SHIPPED | OUTPATIENT
Start: 2023-12-20 | End: 2023-12-23

## 2023-12-20 RX ORDER — BROMPHENIRAMINE MALEATE, PSEUDOEPHEDRINE HYDROCHLORIDE, AND DEXTROMETHORPHAN HYDROBROMIDE 2; 30; 10 MG/5ML; MG/5ML; MG/5ML
10 SYRUP ORAL
Qty: 240 ML | Refills: 0 | Status: SHIPPED | OUTPATIENT
Start: 2023-12-20 | End: 2023-12-30

## 2023-12-20 RX ORDER — CEFTRIAXONE 1 G/1
1 INJECTION, POWDER, FOR SOLUTION INTRAMUSCULAR; INTRAVENOUS
Status: COMPLETED | OUTPATIENT
Start: 2023-12-20 | End: 2023-12-20

## 2023-12-20 RX ORDER — AMOXICILLIN AND CLAVULANATE POTASSIUM 875; 125 MG/1; MG/1
1 TABLET, FILM COATED ORAL EVERY 12 HOURS
Qty: 20 TABLET | Refills: 0 | Status: SHIPPED | OUTPATIENT
Start: 2023-12-20 | End: 2023-12-30

## 2023-12-20 RX ORDER — CEFTRIAXONE 1 G/1
1 INJECTION, POWDER, FOR SOLUTION INTRAMUSCULAR; INTRAVENOUS
Status: CANCELLED | OUTPATIENT
Start: 2023-12-20 | End: 2023-12-20

## 2023-12-20 RX ADMIN — CEFTRIAXONE 1 G: 1 INJECTION, POWDER, FOR SOLUTION INTRAMUSCULAR; INTRAVENOUS at 09:12

## 2023-12-20 RX ADMIN — DEXAMETHASONE SODIUM PHOSPHATE 4 MG: 4 INJECTION, SOLUTION INTRA-ARTICULAR; INTRALESIONAL; INTRAMUSCULAR; INTRAVENOUS; SOFT TISSUE at 09:12

## 2023-12-20 NOTE — PROGRESS NOTES
COREY CHARELS, KELLY   [facility]  [address]  [place]  [address]      PATIENT NAME: Isamar Osorio  : 1965  DATE: 23  MRN: [id number]        Reason for Visit / Chief Complaint: Cough (Productive cough for the past 2 days with frothy sputum.), Sore Throat (Throat is scratchy and irritated.), Nasal Congestion (Runny nose for the past 2 days.), Fever (Started running fever 2 days ago with temp max of 101.4 last night.), Otalgia (Ears feel achy for the past couple of days.  Right ear hurts more than left one.), and Headache (Headache for the past couple of days.)         History of Present Illness / Problem Focused Workflow     57 y/o female presents to clinic with complaints of cough, sore throat, fever, otalgia, and HA. States symptoms started a few days ago. OTC medication is not helping to relieve symptoms.      Review of Systems     @Review of Systems   Constitutional:  Positive for chills and fever. Negative for fatigue.   HENT:  Positive for nasal congestion, ear pain and sore throat. Negative for ear discharge, rhinorrhea and sinus pressure/congestion.    Respiratory:  Positive for choking. Negative for cough, chest tightness, shortness of breath, wheezing and stridor.    Cardiovascular:  Negative for palpitations and claudication.   Gastrointestinal:  Negative for abdominal pain, constipation, diarrhea, nausea, vomiting and reflux.   Genitourinary:  Negative for dysuria, flank pain, frequency, hematuria and urgency.   Musculoskeletal:  Negative for myalgias.   Neurological:  Positive for headaches. Negative for dizziness, weakness and light-headedness.   Psychiatric/Behavioral:  Negative for suicidal ideas.        Medical / Social / Family History     Past Medical History:   Diagnosis Date    Hyperlipidemia     Hypertension        Past Surgical History:   Procedure Laterality Date    ANKLE SURGERY Left      SECTION      CHOLECYSTECTOMY      PARTIAL HYSTERECTOMY              Medications and Allergies     Medications  Outpatient Medications Marked as Taking for the 12/20/23 encounter (Office Visit) with Sanjiv Rodríguez FNP   Medication Sig Dispense Refill    glucosamine sulfate 750 mg Tab Take 1 tablet by mouth once daily.      meloxicam (MOBIC) 15 MG tablet Take 15 mg by mouth once daily.      omeprazole (PRILOSEC) 20 MG capsule Take 20 mg by mouth once daily.      rosuvastatin (CRESTOR) 20 MG tablet Take 20 mg by mouth once daily.       Current Facility-Administered Medications for the 12/20/23 encounter (Office Visit) with Sanjiv Rodríguez FNP   Medication Dose Route Frequency Provider Last Rate Last Admin    [COMPLETED] cefTRIAXone injection 1 g  1 g Intramuscular 1 time in Clinic/HOD Sanjiv Rodríguez FNP   1 g at 12/20/23 0945    [COMPLETED] dexAMETHasone injection 4 mg  4 mg Intramuscular 1 time in Clinic/HOD Sanjiv Rodríguez FNP   4 mg at 12/20/23 0945       Allergies  Review of patient's allergies indicates:  No Known Allergies    Physical Examination     Vitals:    12/20/23 0905   BP: 138/88   Pulse: (!) 112   Resp: 18   Temp: 99.9 °F (37.7 °C)     Physical Exam  Vitals and nursing note reviewed.   Constitutional:       General: She is awake.      Appearance: Normal appearance.   HENT:      Head: Normocephalic.      Right Ear: Tympanic membrane, ear canal and external ear normal.      Left Ear: Tympanic membrane, ear canal and external ear normal.      Nose: Congestion present.      Right Sinus: Maxillary sinus tenderness present.      Left Sinus: Maxillary sinus tenderness present.      Mouth/Throat:      Lips: Pink.      Mouth: Mucous membranes are moist.      Pharynx: Uvula midline. Posterior oropharyngeal erythema present.   Cardiovascular:      Rate and Rhythm: Normal rate and regular rhythm.      Heart sounds: Normal heart sounds, S1 normal and S2 normal.   Pulmonary:      Effort: Pulmonary effort is normal. No respiratory distress.      Breath sounds: Normal  breath sounds. No decreased breath sounds, wheezing, rhonchi or rales.   Abdominal:      General: Bowel sounds are normal.      Palpations: Abdomen is soft.      Tenderness: There is no abdominal tenderness.   Musculoskeletal:      Cervical back: Normal range of motion.   Lymphadenopathy:      Cervical: No cervical adenopathy.   Skin:     General: Skin is warm.      Capillary Refill: Capillary refill takes less than 2 seconds.   Neurological:      Mental Status: She is alert and oriented to person, place, and time.   Psychiatric:         Thought Content: Thought content does not include homicidal or suicidal ideation. Thought content does not include homicidal or suicidal plan.               Procedures   Assessment and Plan (including Health Maintenance)   :    Plan:     Problem List Items Addressed This Visit          ENT    Acute non-recurrent maxillary sinusitis - Primary    Current Assessment & Plan     Rocephin IM and Decadron IM today. Augmentin, Prednisone, and Bromfed RX. Medication instructions and education given with understanding voiced. Rest, increase fluids. OTC antihistamines, decongestants, Ibuprofen, Tylenol as needed. RTC with worsening, new or persistent symptoms.           Relevant Medications    amoxicillin-clavulanate 875-125mg (AUGMENTIN) 875-125 mg per tablet    predniSONE (DELTASONE) 20 MG tablet    brompheniramine-pseudoeph-DM (BROMFED DM) 2-30-10 mg/5 mL Syrp    cefTRIAXone injection 1 g (Completed)    dexAMETHasone injection 4 mg (Completed)    Sore throat    Current Assessment & Plan     Rapid strep and covid negative. Clinic is out of rapid flu tests         Relevant Orders    POCT COVID-19 Rapid Screening (Completed)    POCT rapid strep A (Completed)       Other    Fever    Current Assessment & Plan     Rapid strep and covid negative. Clinic is out of rapid flu tests         Relevant Orders    POCT COVID-19 Rapid Screening (Completed)    POCT rapid strep A (Completed)       [facility]  Maintenance Topics with due status: Not Due       Topic Last Completion Date    Cervical Cancer Screening 08/25/2022    Lipid Panel 08/25/2022    High Dose Statin 12/20/2023       No future appointments.     Health Maintenance Due   Topic Date Due    Hepatitis C Screening  Never done    COVID-19 Vaccine (1) Never done    TETANUS VACCINE  Never done    Hemoglobin A1c (Diabetic Prevention Screening)  Never done    Colorectal Cancer Screening  Never done    Shingles Vaccine (1 of 2) Never done    RSV Vaccine (Age 60+ and Pregnant patients) (1 - 1-dose 60+ series) Never done    Mammogram  08/31/2023    Influenza Vaccine (1) 09/01/2023        Follow up if symptoms worsen or fail to improve.     Signature:  KELLY DE JESUS  [facility]  [address] [place]  [address]    Date of encounter: 12/20/23

## 2023-12-20 NOTE — ASSESSMENT & PLAN NOTE
Rocephin IM and Decadron IM today. Augmentin, Prednisone, and Bromfed RX. Medication instructions and education given with understanding voiced. Rest, increase fluids. OTC antihistamines, decongestants, Ibuprofen, Tylenol as needed. RTC with worsening, new or persistent symptoms.

## 2024-03-25 PROBLEM — J01.00 ACUTE NON-RECURRENT MAXILLARY SINUSITIS: Status: RESOLVED | Noted: 2023-12-20 | Resolved: 2024-03-25

## 2024-05-06 ENCOUNTER — OFFICE VISIT (OUTPATIENT)
Dept: FAMILY MEDICINE | Facility: CLINIC | Age: 59
End: 2024-05-06
Payer: COMMERCIAL

## 2024-05-06 VITALS
WEIGHT: 245.19 LBS | TEMPERATURE: 98 F | BODY MASS INDEX: 36.32 KG/M2 | HEIGHT: 69 IN | RESPIRATION RATE: 20 BRPM | OXYGEN SATURATION: 97 % | DIASTOLIC BLOOD PRESSURE: 76 MMHG | SYSTOLIC BLOOD PRESSURE: 124 MMHG | HEART RATE: 76 BPM

## 2024-05-06 DIAGNOSIS — M54.9 BACK PAIN, UNSPECIFIED BACK LOCATION, UNSPECIFIED BACK PAIN LATERALITY, UNSPECIFIED CHRONICITY: ICD-10-CM

## 2024-05-06 DIAGNOSIS — R82.90 FOUL SMELLING URINE: Primary | ICD-10-CM

## 2024-05-06 DIAGNOSIS — R50.9 FEVER, UNSPECIFIED FEVER CAUSE: ICD-10-CM

## 2024-05-06 LAB
BILIRUB SERPL-MCNC: NORMAL MG/DL
BLOOD URINE, POC: NORMAL
COLOR, POC UA: YELLOW
GLUCOSE UR QL STRIP: NORMAL
KETONES UR QL STRIP: NORMAL
LEUKOCYTE ESTERASE URINE, POC: NORMAL
NITRITE, POC UA: NORMAL
PH, POC UA: 6.5
PROTEIN, POC: NORMAL
SPECIFIC GRAVITY, POC UA: 1.01
UROBILINOGEN, POC UA: 0.2

## 2024-05-06 PROCEDURE — 99213 OFFICE O/P EST LOW 20 MIN: CPT | Mod: ,,,

## 2024-05-06 PROCEDURE — 3008F BODY MASS INDEX DOCD: CPT | Mod: CPTII,,,

## 2024-05-06 PROCEDURE — 3078F DIAST BP <80 MM HG: CPT | Mod: CPTII,,,

## 2024-05-06 PROCEDURE — 4010F ACE/ARB THERAPY RXD/TAKEN: CPT | Mod: CPTII,,,

## 2024-05-06 PROCEDURE — 81003 URINALYSIS AUTO W/O SCOPE: CPT | Mod: QW,,,

## 2024-05-06 PROCEDURE — 1159F MED LIST DOCD IN RCRD: CPT | Mod: CPTII,,,

## 2024-05-06 PROCEDURE — 3074F SYST BP LT 130 MM HG: CPT | Mod: CPTII,,,

## 2024-05-06 PROCEDURE — 1160F RVW MEDS BY RX/DR IN RCRD: CPT | Mod: CPTII,,,

## 2024-05-06 PROCEDURE — 87086 URINE CULTURE/COLONY COUNT: CPT | Mod: ,,, | Performed by: CLINICAL MEDICAL LABORATORY

## 2024-05-06 RX ORDER — ESTRADIOL 0.1 MG/G
CREAM VAGINAL EVERY OTHER DAY
COMMUNITY

## 2024-05-06 NOTE — PROGRESS NOTES
KELLY DE JESUS   [facility]  [address]  [place]  [address]      PATIENT NAME: Isamar Osorio  : 1965  DATE: 24  MRN: [id number]                History of Present Illness / Problem Focused Workflow     59 y/o female presents to clinic with complaints of Fever (Patient is a 58 year old female who presents to the clinic related to fever 102 on Friday.  Patient reports chills body aches since Friday. ), foul odor  (Patient reports foul smelling urine on Thursday, patient does see urologist.  She did have an old rx for Bactrim DS, has taken 3, making her nauseous, not able to eat with them due to no appetite.  Patient is requesting a urine culture, fax results to Dr. Astudillo/Rico Dominguez/[phone]. Patient reports she has been pushing fluids. ), Back Pain (Patient reports low back pain Thursday & Friday, she has also been moving furniture.  ), Emesis (Patient reports vomiting x 4 on Friday & Saturday.  Patient has been able to eat since .       Review of Systems     @Review of Systems   Constitutional:  Positive for fatigue and fever. Negative for chills.   HENT:  Negative for nasal congestion, ear discharge, ear pain, rhinorrhea, sinus pressure/congestion and sore throat.    Respiratory:  Negative for cough, chest tightness, shortness of breath, wheezing and stridor.    Cardiovascular:  Negative for palpitations and claudication.   Gastrointestinal:  Negative for abdominal pain, constipation, diarrhea, nausea, vomiting and reflux.   Genitourinary:  Negative for dysuria, flank pain, frequency, hematuria and urgency.   Musculoskeletal:  Positive for myalgias.   Neurological:  Negative for dizziness, weakness, light-headedness and headaches.   Psychiatric/Behavioral:  Negative for suicidal ideas.        Medical / Social / Family History     Past Medical History:   Diagnosis Date    Hyperlipidemia     Hypertension        Past Surgical History:   Procedure Laterality Date     ANKLE SURGERY Left      SECTION      CHOLECYSTECTOMY      PARTIAL HYSTERECTOMY             Medications and Allergies     Medications  Outpatient Medications Marked as Taking for the 24 encounter (Office Visit) with Sanjiv Rodríguez FNP   Medication Sig Dispense Refill    estradioL (ESTRACE) 0.01 % (0.1 mg/gram) vaginal cream Place vaginally every other day.      glucosamine sulfate 750 mg Tab Take 1 tablet by mouth once daily.      rosuvastatin (CRESTOR) 20 MG tablet Take 10 mg by mouth once daily. Patient taking half of 20 mg to equal 10 mg daily.      telmisartan (MICARDIS) 20 MG Tab Take 1 tablet (20 mg total) by mouth once daily. 30 tablet 1       Allergies  Review of patient's allergies indicates:  No Known Allergies    Physical Examination     Vitals:    24 1447   BP: 124/76   Pulse: 76   Resp: 20   Temp: 98.2 °F (36.8 °C)     Physical Exam  Vitals and nursing note reviewed.   Constitutional:       General: She is awake.      Appearance: Normal appearance.   HENT:      Head: Normocephalic.      Right Ear: Tympanic membrane, ear canal and external ear normal.      Left Ear: Tympanic membrane, ear canal and external ear normal.      Nose: Nose normal.      Mouth/Throat:      Lips: Pink.      Mouth: Mucous membranes are moist.      Pharynx: Oropharynx is clear. Uvula midline.   Cardiovascular:      Rate and Rhythm: Normal rate and regular rhythm.      Heart sounds: Normal heart sounds, S1 normal and S2 normal.   Pulmonary:      Effort: Pulmonary effort is normal. No respiratory distress.      Breath sounds: Normal breath sounds. No decreased breath sounds, wheezing, rhonchi or rales.   Abdominal:      General: Bowel sounds are normal.      Palpations: Abdomen is soft.      Tenderness: There is no abdominal tenderness.   Musculoskeletal:      Cervical back: Normal range of motion.   Skin:     General: Skin is warm.      Capillary Refill: Capillary refill takes less than 2 seconds.    Neurological:      Mental Status: She is alert and oriented to person, place, and time.   Psychiatric:         Thought Content: Thought content does not include homicidal or suicidal ideation. Thought content does not include homicidal or suicidal plan.               Procedures   Assessment and Plan (including Health Maintenance)   :    Plan:     Problem List Items Addressed This Visit          Renal/    Foul smelling urine - Primary    Current Assessment & Plan     UA obtained and urine culture sent. Will call pt with results and any new orders. Will fax results to Dr. Astudillo as requested         Relevant Orders    POCT URINALYSIS W/O SCOPE (Completed)    Urine culture (Completed)       Orthopedic    Back pain    Relevant Orders    POCT URINALYSIS W/O SCOPE (Completed)       Other    Fever    Relevant Orders    POCT URINALYSIS W/O SCOPE (Completed)    Urine culture (Completed)       Health Maintenance Topics with due status: Not Due       Topic Last Completion Date    Cervical Cancer Screening 08/25/2022    Lipid Panel 08/25/2022    Influenza Vaccine 12/15/2022       No future appointments.     Health Maintenance Due   Topic Date Due    Hepatitis C Screening  Never done    TETANUS VACCINE  Never done    Hemoglobin A1c (Diabetic Prevention Screening)  Never done    Colorectal Cancer Screening  Never done    Shingles Vaccine (1 of 2) Never done    RSV Vaccine (Age 60+ and Pregnant patients) (1 - 1-dose 60+ series) Never done    Mammogram  08/31/2023    COVID-19 Vaccine (3 - 2023-24 season) 09/01/2023        Follow up if symptoms worsen or fail to improve.     Signature:  KELLY DE JESUS  [facility]  [address] [address] [place]  [address]    Date of encounter: 5/6/24

## 2024-05-08 LAB — UA COMPLETE W REFLEX CULTURE PNL UR: NORMAL

## 2024-05-15 PROBLEM — M54.9 BACK PAIN: Status: ACTIVE | Noted: 2024-05-15

## 2024-05-15 PROBLEM — R82.90 FOUL SMELLING URINE: Status: ACTIVE | Noted: 2024-05-15

## 2024-05-15 NOTE — ASSESSMENT & PLAN NOTE
UA obtained and urine culture sent. Will call pt with results and any new orders. Will fax results to Dr. Astudillo as requested

## 2024-10-02 ENCOUNTER — PATIENT OUTREACH (OUTPATIENT)
Facility: HOSPITAL | Age: 59
End: 2024-10-02
Payer: OTHER GOVERNMENT

## 2024-11-04 ENCOUNTER — OFFICE VISIT (OUTPATIENT)
Dept: FAMILY MEDICINE | Facility: CLINIC | Age: 59
End: 2024-11-04
Payer: COMMERCIAL

## 2024-11-04 VITALS
RESPIRATION RATE: 21 BRPM | SYSTOLIC BLOOD PRESSURE: 137 MMHG | HEART RATE: 102 BPM | WEIGHT: 249 LBS | TEMPERATURE: 99 F | OXYGEN SATURATION: 97 % | DIASTOLIC BLOOD PRESSURE: 85 MMHG | BODY MASS INDEX: 36.88 KG/M2 | HEIGHT: 69 IN

## 2024-11-04 DIAGNOSIS — J01.00 ACUTE MAXILLARY SINUSITIS, RECURRENCE NOT SPECIFIED: Primary | ICD-10-CM

## 2024-11-04 DIAGNOSIS — R05.9 COUGH, UNSPECIFIED TYPE: ICD-10-CM

## 2024-11-04 DIAGNOSIS — E66.01 SEVERE OBESITY (BMI 35.0-39.9) WITH COMORBIDITY: ICD-10-CM

## 2024-11-04 DIAGNOSIS — R51.9 ACUTE NONINTRACTABLE HEADACHE, UNSPECIFIED HEADACHE TYPE: ICD-10-CM

## 2024-11-04 LAB
CTP QC/QA: YES
CTP QC/QA: YES
POC MOLECULAR INFLUENZA A AGN: NEGATIVE
POC MOLECULAR INFLUENZA B AGN: NEGATIVE
SARS-COV-2 RDRP RESP QL NAA+PROBE: NEGATIVE

## 2024-11-04 PROCEDURE — 96372 THER/PROPH/DIAG INJ SC/IM: CPT | Mod: ,,,

## 2024-11-04 PROCEDURE — 1159F MED LIST DOCD IN RCRD: CPT | Mod: CPTII,,,

## 2024-11-04 PROCEDURE — 87502 INFLUENZA DNA AMP PROBE: CPT | Mod: QW,,,

## 2024-11-04 PROCEDURE — 99213 OFFICE O/P EST LOW 20 MIN: CPT | Mod: 25,,,

## 2024-11-04 PROCEDURE — 4010F ACE/ARB THERAPY RXD/TAKEN: CPT | Mod: CPTII,,,

## 2024-11-04 PROCEDURE — 1160F RVW MEDS BY RX/DR IN RCRD: CPT | Mod: CPTII,,,

## 2024-11-04 PROCEDURE — 3008F BODY MASS INDEX DOCD: CPT | Mod: CPTII,,,

## 2024-11-04 PROCEDURE — 3079F DIAST BP 80-89 MM HG: CPT | Mod: CPTII,,,

## 2024-11-04 PROCEDURE — 87635 SARS-COV-2 COVID-19 AMP PRB: CPT | Mod: QW,,,

## 2024-11-04 PROCEDURE — 3075F SYST BP GE 130 - 139MM HG: CPT | Mod: CPTII,,,

## 2024-11-04 RX ORDER — DEXTROMETHORPHAN HBR, PHENYLEPHRINE HCL, PYRILAMINE MALEATE 7.5; 5; 12.5 MG/5ML; MG/5ML; MG/5ML
5 SYRUP ORAL EVERY 6 HOURS PRN
Qty: 100 ML | Refills: 0 | Status: SHIPPED | OUTPATIENT
Start: 2024-11-04

## 2024-11-04 RX ORDER — DEXAMETHASONE SODIUM PHOSPHATE 4 MG/ML
4 INJECTION, SOLUTION INTRA-ARTICULAR; INTRALESIONAL; INTRAMUSCULAR; INTRAVENOUS; SOFT TISSUE
Status: COMPLETED | OUTPATIENT
Start: 2024-11-04 | End: 2024-11-04

## 2024-11-04 RX ORDER — FAMOTIDINE 40 MG/1
20 TABLET, FILM COATED ORAL
COMMUNITY
Start: 2024-05-14

## 2024-11-04 RX ORDER — CEFTRIAXONE 1 G/1
1 INJECTION, POWDER, FOR SOLUTION INTRAMUSCULAR; INTRAVENOUS
Status: COMPLETED | OUTPATIENT
Start: 2024-11-04 | End: 2024-11-04

## 2024-11-04 RX ORDER — PANTOPRAZOLE SODIUM 40 MG/1
40 TABLET, DELAYED RELEASE ORAL DAILY
COMMUNITY
Start: 2024-05-14

## 2024-11-04 RX ORDER — AMOXICILLIN AND CLAVULANATE POTASSIUM 875; 125 MG/1; MG/1
1 TABLET, FILM COATED ORAL EVERY 12 HOURS
Qty: 20 TABLET | Refills: 0 | Status: SHIPPED | OUTPATIENT
Start: 2024-11-04 | End: 2024-11-14

## 2024-11-04 RX ADMIN — CEFTRIAXONE 1 G: 1 INJECTION, POWDER, FOR SOLUTION INTRAMUSCULAR; INTRAVENOUS at 10:11

## 2024-11-04 RX ADMIN — DEXAMETHASONE SODIUM PHOSPHATE 4 MG: 4 INJECTION, SOLUTION INTRA-ARTICULAR; INTRALESIONAL; INTRAMUSCULAR; INTRAVENOUS; SOFT TISSUE at 10:11

## 2024-11-04 NOTE — PROGRESS NOTES
KELLY DE JESUS   [facility]  [address]  [place]  [address]      PATIENT NAME: Isamar Osorio  : 1965  DATE: 24  MRN: [id number]        Reason for Visit / Chief Complaint: Nasal Congestion (Isamar Osorio 59 year old c/f presents with nasal congestion x2 days.), Cough (Tickle in theroat started x3 days.), Headache (Mild Headache x2 days), and Fever (Reports low grade fever at 12AM . She took Tylenol sinus then)         History of Present Illness / Problem Focused Workflow     58 y/o female presents to clinic with complaints of congestion, sinus pressure/drainage, cough, sore throat, HA and fever that started a few days ago. She has taken OTC medication with little relief.       Review of Systems     @Review of Systems   Constitutional:  Positive for fever. Negative for chills and fatigue.   HENT:  Positive for nasal congestion, sinus pressure/congestion and sore throat. Negative for ear discharge, ear pain and rhinorrhea.    Respiratory:  Positive for cough. Negative for chest tightness, shortness of breath, wheezing and stridor.    Cardiovascular:  Negative for palpitations and claudication.   Gastrointestinal:  Negative for abdominal pain, constipation, diarrhea, nausea, vomiting and reflux.   Genitourinary:  Negative for dysuria, flank pain, frequency, hematuria and urgency.   Musculoskeletal:  Negative for myalgias.   Neurological:  Positive for headaches. Negative for dizziness, weakness and light-headedness.   Psychiatric/Behavioral:  Negative for suicidal ideas.        Medical / Social / Family History     Past Medical History:   Diagnosis Date    Hyperlipidemia     Hypertension        Past Surgical History:   Procedure Laterality Date    ANKLE SURGERY Left      SECTION      CHOLECYSTECTOMY      PARTIAL HYSTERECTOMY             Medications and Allergies     Medications  Outpatient Medications Marked as Taking for the 24 encounter (Office Visit) with  Sanjiv Rodríguez FNP   Medication Sig Dispense Refill    estradioL (ESTRACE) 0.01 % (0.1 mg/gram) vaginal cream Place vaginally every other day.      famotidine (PEPCID) 40 MG tablet Take 20 mg by mouth.      glucosamine sulfate 750 mg Tab Take 1 tablet by mouth once daily.      pantoprazole (PROTONIX) 40 MG tablet Take 40 mg by mouth once daily.      rosuvastatin (CRESTOR) 20 MG tablet Take 10 mg by mouth once daily. Patient taking half of 20 mg to equal 10 mg daily.      telmisartan (MICARDIS) 20 MG Tab Take 1 tablet (20 mg total) by mouth once daily. 30 tablet 1     Current Facility-Administered Medications for the 11/4/24 encounter (Office Visit) with Sanjiv Rodríguez FNP   Medication Dose Route Frequency Provider Last Rate Last Admin    [COMPLETED] cefTRIAXone injection 1 g  1 g Intramuscular 1 time in Clinic/HOD Sanjiv Rodríguez FNP   1 g at 11/04/24 1024    [COMPLETED] dexAMETHasone injection 4 mg  4 mg Intramuscular 1 time in Clinic/HOD Sanjiv Rodríguez FNP   4 mg at 11/04/24 1025       Allergies  Review of patient's allergies indicates:  No Known Allergies    Physical Examination     Vitals:    11/04/24 0927   BP: 137/85   Pulse: 102   Resp: (!) 21   Temp: 98.5 °F (36.9 °C)     Physical Exam  Vitals and nursing note reviewed.   Constitutional:       General: She is awake.      Appearance: Normal appearance.   HENT:      Head: Normocephalic.      Right Ear: Tympanic membrane, ear canal and external ear normal.      Left Ear: Tympanic membrane, ear canal and external ear normal.      Nose: Congestion present.      Right Sinus: Maxillary sinus tenderness present.      Left Sinus: Maxillary sinus tenderness present.      Mouth/Throat:      Lips: Pink.      Mouth: Mucous membranes are moist.      Pharynx: Oropharynx is clear. Uvula midline. Posterior oropharyngeal erythema present.   Cardiovascular:      Rate and Rhythm: Normal rate and regular rhythm.      Heart sounds: Normal heart sounds, S1 normal and S2  normal.   Pulmonary:      Effort: Pulmonary effort is normal. No respiratory distress.      Breath sounds: Normal breath sounds. No decreased breath sounds, wheezing, rhonchi or rales.   Abdominal:      General: Bowel sounds are normal.      Palpations: Abdomen is soft.      Tenderness: There is no abdominal tenderness.   Musculoskeletal:      Cervical back: Normal range of motion.   Lymphadenopathy:      Cervical: No cervical adenopathy.   Skin:     General: Skin is warm.      Capillary Refill: Capillary refill takes less than 2 seconds.   Neurological:      Mental Status: She is alert and oriented to person, place, and time.   Psychiatric:         Thought Content: Thought content does not include homicidal or suicidal ideation. Thought content does not include homicidal or suicidal plan.               Procedures   Assessment and Plan (including Health Maintenance)   :    Plan:     Problem List Items Addressed This Visit       Severe obesity (BMI 35.0-39.9) with comorbidity    Acute maxillary sinusitis - Primary    Current Assessment & Plan     Rocephin IM and Decadron IM today. Augmentin RX. Medication instructions and education given with understanding voiced. Rest, increase fluids. OTC antihistamines, decongestants, Ibuprofen, Tylenol as needed. RTC with worsening, new or persistent symptoms.           Relevant Medications    cefTRIAXone injection 1 g (Completed)    dexAMETHasone injection 4 mg (Completed)    amoxicillin-clavulanate 875-125mg (AUGMENTIN) 875-125 mg per tablet    pyrilamine-phenylephrine-DM (POLYTUSSIN DM,PYRILAMINE,) 12.5-5-7.5 mg/5 mL Liqd    Acute nonintractable headache    Relevant Orders    POCT COVID-19 Rapid Screening (Completed)    POCT Influenza A/B Molecular (Completed)    Cough    Current Assessment & Plan     Polytussin RX for cough today         Relevant Medications    pyrilamine-phenylephrine-DM (POLYTUSSIN DM,PYRILAMINE,) 12.5-5-7.5 mg/5 mL Liqd    Other Relevant Orders    POCT  COVID-19 Rapid Screening (Completed)    POCT Influenza A/B Molecular (Completed)       Health Maintenance Topics with due status: Not Due       Topic Last Completion Date    Cervical Cancer Screening 08/25/2022    Lipid Panel 08/25/2022    RSV Vaccine (Age 60+ and Pregnant patients) Not Due       No future appointments.     Health Maintenance Due   Topic Date Due    Hepatitis C Screening  Never done    HIV Screening  Never done    TETANUS VACCINE  Never done    Hemoglobin A1c (Diabetic Prevention Screening)  Never done    Colorectal Cancer Screening  Never done    Shingles Vaccine (1 of 2) Never done    Mammogram  08/31/2023    Influenza Vaccine (1) 09/01/2024    COVID-19 Vaccine (3 - 2024-25 season) 09/01/2024        Follow up if symptoms worsen or fail to improve.     Signature:  KELLY DE JESUS  [address] [place]  [address]    Date of encounter: 11/4/24

## 2024-11-14 ENCOUNTER — TELEPHONE (OUTPATIENT)
Dept: FAMILY MEDICINE | Facility: CLINIC | Age: 59
End: 2024-11-14
Payer: COMMERCIAL

## 2024-11-14 DIAGNOSIS — B37.9 YEAST INFECTION: Primary | ICD-10-CM

## 2024-11-14 RX ORDER — FLUCONAZOLE 150 MG/1
150 TABLET ORAL DAILY
Qty: 3 TABLET | Refills: 0 | Status: SHIPPED | OUTPATIENT
Start: 2024-11-14

## 2024-11-14 NOTE — TELEPHONE ENCOUNTER
Patient called this morning, she is out of diflucan, amoxicillin prescribed on 2024, her refills have , requesting to send a new rx to pharmacy/[facility]

## 2024-12-18 ENCOUNTER — TELEPHONE (OUTPATIENT)
Dept: FAMILY MEDICINE | Facility: CLINIC | Age: 59
End: 2024-12-18
Payer: COMMERCIAL

## 2024-12-18 DIAGNOSIS — I10 ESSENTIAL HYPERTENSION, BENIGN: Primary | ICD-10-CM

## 2024-12-18 DIAGNOSIS — E78.2 MIXED HYPERLIPIDEMIA: ICD-10-CM

## 2024-12-18 DIAGNOSIS — Z13.1 SCREENING FOR DIABETES MELLITUS (DM): ICD-10-CM

## 2024-12-18 NOTE — TELEPHONE ENCOUNTER
Lia with [facility] called she said she faxed labs over to fax #[phone], & she is requesting that we fax over A1C to them at [phone], I only see an order that was just put in today.

## 2025-01-03 ENCOUNTER — OFFICE VISIT (OUTPATIENT)
Dept: FAMILY MEDICINE | Facility: CLINIC | Age: 60
End: 2025-01-03
Payer: COMMERCIAL

## 2025-01-03 ENCOUNTER — LAB REQUISITION (OUTPATIENT)
Dept: LAB | Facility: HOSPITAL | Age: 60
End: 2025-01-03
Payer: COMMERCIAL

## 2025-01-03 VITALS
DIASTOLIC BLOOD PRESSURE: 87 MMHG | HEIGHT: 69 IN | BODY MASS INDEX: 37.59 KG/M2 | HEART RATE: 89 BPM | TEMPERATURE: 98 F | WEIGHT: 253.81 LBS | SYSTOLIC BLOOD PRESSURE: 133 MMHG | OXYGEN SATURATION: 96 % | RESPIRATION RATE: 19 BRPM

## 2025-01-03 DIAGNOSIS — M17.11 UNILATERAL PRIMARY OSTEOARTHRITIS, RIGHT KNEE: ICD-10-CM

## 2025-01-03 DIAGNOSIS — E66.01 SEVERE OBESITY (BMI 35.0-39.9) WITH COMORBIDITY: ICD-10-CM

## 2025-01-03 DIAGNOSIS — N30.00 ACUTE CYSTITIS WITHOUT HEMATURIA: ICD-10-CM

## 2025-01-03 DIAGNOSIS — Z01.818 PREOPERATIVE CLEARANCE: Primary | ICD-10-CM

## 2025-01-03 DIAGNOSIS — E55.9 VITAMIN D DEFICIENCY: ICD-10-CM

## 2025-01-03 LAB
ALBUMIN SERPL BCP-MCNC: 4.3 G/DL (ref 3.4–4.8)
ALBUMIN/GLOB SERPL: 1.6 {RATIO}
ALP SERPL-CCNC: 129 U/L (ref 40–150)
ALT SERPL W P-5'-P-CCNC: 49 U/L
ANION GAP SERPL CALCULATED.3IONS-SCNC: 15 MMOL/L (ref 7–16)
AST SERPL W P-5'-P-CCNC: 38 U/L (ref 5–34)
BACTERIA #/AREA URNS HPF: ABNORMAL /HPF
BASOPHILS # BLD AUTO: 0.02 K/UL (ref 0–0.2)
BASOPHILS NFR BLD AUTO: 0.3 % (ref 0–1)
BILIRUB SERPL-MCNC: 0.8 MG/DL
BILIRUB UR QL STRIP: NEGATIVE
BUN SERPL-MCNC: 10 MG/DL (ref 10–20)
BUN/CREAT SERPL: 14 (ref 6–20)
CALCIUM SERPL-MCNC: 10.2 MG/DL (ref 8.4–10.2)
CHLORIDE SERPL-SCNC: 108 MMOL/L (ref 98–107)
CLARITY UR: ABNORMAL
CO2 SERPL-SCNC: 19 MMOL/L (ref 23–31)
COLOR UR: YELLOW
CREAT SERPL-MCNC: 0.74 MG/DL (ref 0.55–1.02)
DIFFERENTIAL METHOD BLD: ABNORMAL
EGFR (NO RACE VARIABLE) (RUSH/TITUS): 92 ML/MIN/1.73M2
EOSINOPHIL # BLD AUTO: 0.15 K/UL (ref 0–0.5)
EOSINOPHIL NFR BLD AUTO: 2.1 % (ref 1–4)
ERYTHROCYTE [DISTWIDTH] IN BLOOD BY AUTOMATED COUNT: 12.1 % (ref 11.5–14.5)
EST. AVERAGE GLUCOSE BLD GHB EST-MCNC: 137 MG/DL
GLOBULIN SER-MCNC: 2.7 G/DL (ref 2–4)
GLUCOSE SERPL-MCNC: 116 MG/DL (ref 82–115)
GLUCOSE UR STRIP-MCNC: NEGATIVE MG/DL
HBA1C MFR BLD HPLC: 6.4 %
HCT VFR BLD AUTO: 44.7 % (ref 38–47)
HGB BLD-MCNC: 14.9 G/DL (ref 12–16)
INR BLD: 0.9
KETONES UR STRIP-SCNC: NEGATIVE MG/DL
LEUKOCYTE ESTERASE UR QL STRIP: ABNORMAL
LYMPHOCYTES # BLD AUTO: 2.74 K/UL (ref 1–4.8)
LYMPHOCYTES NFR BLD AUTO: 37.8 % (ref 27–41)
MCH RBC QN AUTO: 31 PG (ref 27–31)
MCHC RBC AUTO-ENTMCNC: 33.3 G/DL (ref 32–36)
MCV RBC AUTO: 93.1 FL (ref 80–96)
MONOCYTES # BLD AUTO: 0.48 K/UL (ref 0–0.8)
MONOCYTES NFR BLD AUTO: 6.6 % (ref 2–6)
MPC BLD CALC-MCNC: 10.3 FL (ref 9.4–12.4)
NEUTROPHILS # BLD AUTO: 3.85 K/UL (ref 1.8–7.7)
NEUTROPHILS NFR BLD AUTO: 53.2 % (ref 53–65)
NITRITE UR QL STRIP: POSITIVE
PH UR STRIP: 6 PH UNITS
PLATELET # BLD AUTO: 404 K/UL (ref 150–400)
POTASSIUM SERPL-SCNC: 4.3 MMOL/L (ref 3.5–5.1)
PROT SERPL-MCNC: 7 G/DL (ref 5.8–7.6)
PROT UR QL STRIP: NEGATIVE
PROTHROMBIN TIME: 12.8 SECONDS (ref 11.7–14.7)
RBC # BLD AUTO: 4.8 M/UL (ref 4.2–5.4)
RBC # UR STRIP: NEGATIVE /UL
RBC #/AREA URNS HPF: ABNORMAL /HPF
SODIUM SERPL-SCNC: 138 MMOL/L (ref 136–145)
SP GR UR STRIP: 1.02
SQUAMOUS #/AREA URNS LPF: ABNORMAL /LPF
UROBILINOGEN UR STRIP-ACNC: 0.2 MG/DL
WBC # BLD AUTO: 7.24 K/UL (ref 4.5–11)
WBC #/AREA URNS HPF: ABNORMAL /HPF

## 2025-01-03 PROCEDURE — 85610 PROTHROMBIN TIME: CPT | Performed by: ORTHOPAEDIC SURGERY

## 2025-01-03 PROCEDURE — 83036 HEMOGLOBIN GLYCOSYLATED A1C: CPT | Performed by: ORTHOPAEDIC SURGERY

## 2025-01-03 PROCEDURE — 85025 COMPLETE CBC W/AUTO DIFF WBC: CPT | Performed by: ORTHOPAEDIC SURGERY

## 2025-01-03 PROCEDURE — 81003 URINALYSIS AUTO W/O SCOPE: CPT | Performed by: ORTHOPAEDIC SURGERY

## 2025-01-03 PROCEDURE — 80053 COMPREHEN METABOLIC PANEL: CPT | Performed by: ORTHOPAEDIC SURGERY

## 2025-01-03 PROCEDURE — 87077 CULTURE AEROBIC IDENTIFY: CPT | Mod: 91 | Performed by: ORTHOPAEDIC SURGERY

## 2025-01-03 RX ORDER — MUPIROCIN 20 MG/G
OINTMENT TOPICAL
COMMUNITY
Start: 2024-12-05

## 2025-01-03 RX ORDER — ERGOCALCIFEROL 1.25 MG/1
50000 CAPSULE ORAL
Qty: 12 CAPSULE | Refills: 1 | Status: SHIPPED | OUTPATIENT
Start: 2025-01-03 | End: 2025-01-03

## 2025-01-03 RX ORDER — ERGOCALCIFEROL 1.25 MG/1
50000 CAPSULE ORAL
Qty: 12 CAPSULE | Refills: 1 | Status: SHIPPED | OUTPATIENT
Start: 2025-01-03

## 2025-01-06 ENCOUNTER — TELEPHONE (OUTPATIENT)
Dept: FAMILY MEDICINE | Facility: CLINIC | Age: 60
End: 2025-01-06
Payer: COMMERCIAL

## 2025-01-06 NOTE — TELEPHONE ENCOUNTER
----- Message from KELLY Jean Baptiste sent at 1/6/2025  2:35 PM CST -----  CXR normal. No acute abnormality noted

## 2025-01-07 LAB
UA COMPLETE W REFLEX CULTURE PNL UR: ABNORMAL
UA COMPLETE W REFLEX CULTURE PNL UR: ABNORMAL

## 2025-01-08 RX ORDER — NITROFURANTOIN 25; 75 MG/1; MG/1
100 CAPSULE ORAL 2 TIMES DAILY
Qty: 14 CAPSULE | Refills: 0 | Status: SHIPPED | OUTPATIENT
Start: 2025-01-08 | End: 2025-01-15

## 2025-01-10 ENCOUNTER — CLINICAL SUPPORT (OUTPATIENT)
Dept: FAMILY MEDICINE | Facility: CLINIC | Age: 60
End: 2025-01-10
Payer: COMMERCIAL

## 2025-01-10 DIAGNOSIS — Z87.440 RECENT URINARY TRACT INFECTION: Primary | ICD-10-CM

## 2025-01-10 DIAGNOSIS — N30.00 ACUTE CYSTITIS WITHOUT HEMATURIA: Primary | ICD-10-CM

## 2025-01-10 PROBLEM — Z01.818 PREOPERATIVE CLEARANCE: Status: ACTIVE | Noted: 2025-01-10

## 2025-01-10 PROBLEM — E55.9 VITAMIN D DEFICIENCY: Status: ACTIVE | Noted: 2025-01-10

## 2025-01-10 PROCEDURE — 96372 THER/PROPH/DIAG INJ SC/IM: CPT | Mod: ,,,

## 2025-01-10 RX ORDER — CEFTRIAXONE 1 G/1
1 INJECTION, POWDER, FOR SOLUTION INTRAMUSCULAR; INTRAVENOUS
Status: COMPLETED | OUTPATIENT
Start: 2025-01-10 | End: 2025-01-10

## 2025-01-10 RX ADMIN — CEFTRIAXONE 1 G: 1 INJECTION, POWDER, FOR SOLUTION INTRAMUSCULAR; INTRAVENOUS at 03:01

## 2025-01-10 NOTE — ASSESSMENT & PLAN NOTE
UA obtained and urine culture sent. Will call pt with results and any new orders.  Increase fluids, cranberry juice, good hygiene.

## 2025-01-10 NOTE — PROGRESS NOTES
KELLY DE JESUS   [facility]  [address] [address] [address]  [phone]      PATIENT NAME: Isamar Osorio  : 1965  DATE: 1/3/25  MRN: [id number]      Billing Provider: KELLY DE JESUS  Level of Service: NC OFFICE/OUTPT VISIT, EST, LEVL III, 20-29 MIN  Patient PCP Information       Provider PCP Type    KELLY DE JESUS General            Chief Complaint   Patient presents with    surgical clearance     Isamar Osorio 59 year old c/f presents to clinic for surgical clearance for right knee replacement scheduled on 01/15/2025 with Dr. Richard and [facility]. She will need an EKG and a chest xray today and lab work . She did bring Surgical Clearance instruction list with her and also needs lab work.         Medications and Allergies     Medications  Outpatient Medications Marked as Taking for the 1/3/25 encounter (Office Visit) with Sanjiv Rodríguez FNP   Medication Sig Dispense Refill    estradioL (ESTRACE) 0.01 % (0.1 mg/gram) vaginal cream Place vaginally every other day.      famotidine (PEPCID) 40 MG tablet Take 20 mg by mouth.      mupirocin (BACTROBAN) 2 % ointment SMARTSI Application Topical 2-3 Times Daily      pantoprazole (PROTONIX) 40 MG tablet Take 40 mg by mouth once daily.      rosuvastatin (CRESTOR) 20 MG tablet Take 10 mg by mouth once daily. Patient taking half of 20 mg to equal 10 mg daily.      telmisartan (MICARDIS) 20 MG Tab Take 1 tablet (20 mg total) by mouth once daily. 30 tablet 1       Allergies  Review of patient's allergies indicates:  No Known Allergies    History of Present Illness    CHIEF COMPLAINT:  Patient presents today for follow-up regarding upcoming surgery.               Review of Systems   Constitutional:  Negative for chills, fatigue and fever.   HENT:  Negative for congestion, ear discharge, ear pain, sinus pressure, sinus pain and sore throat.    Respiratory:  Negative for cough, chest tightness,  shortness of breath and wheezing.    Cardiovascular:  Negative for chest pain and palpitations.   Gastrointestinal:  Negative for abdominal pain, constipation, diarrhea, nausea and vomiting.   Genitourinary:  Negative for dysuria, flank pain and hematuria.   Neurological:  Negative for dizziness, weakness, light-headedness and headaches.   Psychiatric/Behavioral:  Negative for suicidal ideas.        Physical Exam  Vitals and nursing note reviewed.   Constitutional:       General: She is awake.      Appearance: Normal appearance.   HENT:      Head: Normocephalic.      Right Ear: Tympanic membrane, ear canal and external ear normal.      Left Ear: Tympanic membrane, ear canal and external ear normal.      Nose: Nose normal.      Mouth/Throat:      Lips: Pink.      Mouth: Mucous membranes are moist.      Pharynx: Oropharynx is clear. Uvula midline.   Cardiovascular:      Rate and Rhythm: Normal rate and regular rhythm.      Heart sounds: Normal heart sounds, S1 normal and S2 normal.   Pulmonary:      Effort: Pulmonary effort is normal. No respiratory distress.      Breath sounds: Normal breath sounds. No decreased breath sounds, wheezing, rhonchi or rales.   Abdominal:      General: Bowel sounds are normal.      Palpations: Abdomen is soft.      Tenderness: There is no abdominal tenderness.   Musculoskeletal:      Cervical back: Normal range of motion.   Skin:     General: Skin is warm.      Capillary Refill: Capillary refill takes less than 2 seconds.   Neurological:      Mental Status: She is alert and oriented to person, place, and time.   Psychiatric:         Thought Content: Thought content does not include homicidal or suicidal ideation. Thought content does not include homicidal or suicidal plan.         Assessment & Plan    IMPRESSION:  - Reviewed EKG results, which were normal  - Assessed recent lab results, including calcium and vitamin D levels  - Determined patient's vitamin D level was borderline low,  warranting supplementation  - Considered prescription-strength vitamin D supplementation as more effective than OTC options      D DEFICIENCY:  - Instructed patient to take the prescribed vitamin D every Friday and recommended setting a phone alarm as a reminder.  - Explained that prescription medications may be more effective than OTC alternatives.  - Scheduled follow up in 3 months for reassessment of vitamin D levels.    LABS:  - Instructed the patient to go to [facility] to complete the remaining labs.    UP:  - Acknowledged upcoming surgery on the 15th.          Health Maintenance Due   Topic Date Due    Hepatitis C Screening  Never done    HIV Screening  Never done    TETANUS VACCINE  Never done    Colorectal Cancer Screening  Never done    Shingles Vaccine (1 of 2) Never done    Pneumococcal Vaccines (Age 50+) (1 of 1 - PCV) Never done    RSV Vaccine (Age 60+ and Pregnant patients) (1 - Risk 60-74 years 1-dose series) Never done    Mammogram  08/31/2023    Influenza Vaccine (1) 09/01/2024    COVID-19 Vaccine (3 - 2024-25 season) 09/01/2024       Problem List Items Addressed This Visit       Severe obesity (BMI 35.0-39.9) with comorbidity    Current Assessment & Plan     Diet and exercise discussed with pt          Preoperative clearance - Primary    Current Assessment & Plan     Pt is cleared for right knee replacement surgery.          Relevant Orders    POCT EKG 12-LEAD (Manually Resulted by Ordering Provider)    X-Ray Chest PA And Lateral (Completed)    Acute cystitis without hematuria    Current Assessment & Plan     UA obtained and urine culture sent. Will call pt with results and any new orders.  Increase fluids, cranberry juice, good hygiene.          Relevant Medications    nitrofurantoin, macrocrystal-monohydrate, (MACROBID) 100 MG capsule    Vitamin D deficiency    Relevant Medications    ergocalciferol (ERGOCALCIFEROL) 50,000 unit Cap       Health Maintenance Topics with due status: Not Due        Topic Last Completion Date    Cervical Cancer Screening 08/25/2022    Lipid Panel 12/20/2024    Hemoglobin A1c (Prediabetes) 01/03/2025       Future Appointments   Date Time Provider Department Center   1/10/2025  3:00 PM [facility] Rod[facility]        This note was generated with the assistance of ambient listening technology. Verbal consent was obtained by the patient and accompanying visitor(s) for the recording of patient appointment to facilitate this note. I attest to having reviewed and edited the generated note for accuracy, though some syntax or spelling errors may persist. Please contact the author of this note for any clarification.     Signature:  KELLY DE JESUS  [facility] [address] [address]  [phone]    Date of encounter: 1/3/25

## 2025-01-13 ENCOUNTER — CLINICAL SUPPORT (OUTPATIENT)
Dept: FAMILY MEDICINE | Facility: CLINIC | Age: 60
End: 2025-01-13
Payer: COMMERCIAL

## 2025-01-13 DIAGNOSIS — N30.00 ACUTE CYSTITIS WITHOUT HEMATURIA: Primary | ICD-10-CM

## 2025-01-13 PROCEDURE — 96372 THER/PROPH/DIAG INJ SC/IM: CPT | Mod: ,,,

## 2025-01-13 RX ORDER — CEFTRIAXONE 1 G/1
1 INJECTION, POWDER, FOR SOLUTION INTRAMUSCULAR; INTRAVENOUS
Status: COMPLETED | OUTPATIENT
Start: 2025-01-13 | End: 2025-01-14

## 2025-01-13 RX ADMIN — CEFTRIAXONE 1 G: 1 INJECTION, POWDER, FOR SOLUTION INTRAMUSCULAR; INTRAVENOUS at 03:01

## 2025-01-14 ENCOUNTER — CLINICAL SUPPORT (OUTPATIENT)
Dept: FAMILY MEDICINE | Facility: CLINIC | Age: 60
End: 2025-01-14
Payer: COMMERCIAL

## 2025-01-14 DIAGNOSIS — N30.00 ACUTE CYSTITIS WITHOUT HEMATURIA: Primary | ICD-10-CM

## 2025-01-14 DIAGNOSIS — N39.0 URINARY TRACT INFECTION WITHOUT HEMATURIA, SITE UNSPECIFIED: ICD-10-CM

## 2025-01-14 PROCEDURE — 96372 THER/PROPH/DIAG INJ SC/IM: CPT | Mod: ,,,

## 2025-01-14 RX ADMIN — CEFTRIAXONE 1 G: 1 INJECTION, POWDER, FOR SOLUTION INTRAMUSCULAR; INTRAVENOUS at 03:01

## 2025-01-14 NOTE — PROGRESS NOTES
Pt presented to clinic for 3rd and final Rocephin IM injection per standing order from KELLY Mota. Injection given per protocol, Pt tolerated well. Patient will follow up as needed.

## 2025-01-22 DIAGNOSIS — Z96.651 S/P TOTAL KNEE ARTHROPLASTY, RIGHT: Primary | ICD-10-CM

## 2025-01-24 ENCOUNTER — CLINICAL SUPPORT (OUTPATIENT)
Dept: REHABILITATION | Facility: HOSPITAL | Age: 60
End: 2025-01-24
Payer: COMMERCIAL

## 2025-01-24 ENCOUNTER — TELEPHONE (OUTPATIENT)
Dept: FAMILY MEDICINE | Facility: CLINIC | Age: 60
End: 2025-01-24
Payer: COMMERCIAL

## 2025-01-24 DIAGNOSIS — M25.661 DECREASED ROM OF RIGHT KNEE: ICD-10-CM

## 2025-01-24 DIAGNOSIS — Z74.09 DECREASED FUNCTIONAL MOBILITY AND ENDURANCE: ICD-10-CM

## 2025-01-24 DIAGNOSIS — Z96.651 S/P TOTAL KNEE ARTHROPLASTY, RIGHT: Primary | ICD-10-CM

## 2025-01-24 LAB
BILIRUB SERPL-MCNC: NEGATIVE MG/DL
BLOOD URINE, POC: NEGATIVE
CLARITY, UA: CLEAR
COLOR, UA: YELLOW
GLUCOSE UR QL STRIP: NEGATIVE
KETONES UR QL STRIP: NEGATIVE
LEUKOCYTE ESTERASE URINE, POC: NEGATIVE
NITRITE, POC UA: NEGATIVE
PH, POC UA: 6.5
PROTEIN, POC: NEGATIVE
SPECIFIC GRAVITY, POC UA: 1.02
UROBILINOGEN, POC UA: 1

## 2025-01-24 PROCEDURE — 97110 THERAPEUTIC EXERCISES: CPT | Mod: PN

## 2025-01-24 PROCEDURE — 97161 PT EVAL LOW COMPLEX 20 MIN: CPT | Mod: PN

## 2025-01-24 RX ORDER — ENOXAPARIN SODIUM 100 MG/ML
INJECTION SUBCUTANEOUS DAILY
COMMUNITY
Start: 2025-01-06

## 2025-01-24 RX ORDER — ONDANSETRON 4 MG/1
4 TABLET, ORALLY DISINTEGRATING ORAL EVERY 6 HOURS PRN
COMMUNITY
Start: 2025-01-06

## 2025-01-24 RX ORDER — CEPHALEXIN 500 MG/1
500 CAPSULE ORAL 2 TIMES DAILY
COMMUNITY
Start: 2025-01-06

## 2025-01-24 RX ORDER — DOCUSATE SODIUM 100 MG/1
100 CAPSULE, LIQUID FILLED ORAL 3 TIMES DAILY
COMMUNITY

## 2025-01-24 RX ORDER — CYCLOBENZAPRINE HCL 10 MG
10 TABLET ORAL 2 TIMES DAILY PRN
COMMUNITY
Start: 2025-01-15

## 2025-01-24 RX ORDER — ASPIRIN 81 MG/1
81 TABLET ORAL 2 TIMES DAILY
COMMUNITY

## 2025-01-24 RX ORDER — OXYCODONE AND ACETAMINOPHEN 5; 325 MG/1; MG/1
1 TABLET ORAL EVERY 6 HOURS PRN
COMMUNITY
Start: 2025-01-15

## 2025-01-24 NOTE — PROGRESS NOTES
Outpatient Rehab    Physical Therapy Evaluation (only)    Patient Name: Isamar Osorio  MRN: [id number]  YOB: 1965  Today's Date: 2025    Therapy Diagnosis:   Encounter Diagnoses   Name Primary?    S/P total knee arthroplasty, right Yes    Decreased functional mobility and endurance     Decreased ROM of right knee      Physician: Teo Richard,*    Physician Orders: Eval and Treat  Medical Diagnosis: right total knee arthroplasty     Visit # / Visits Authorized:   / no limit    Date of Evaluation:  2025   Insurance Authorization Period: 2025  to end of    Plan of Care Certification:  2025 to 3/20/2025       Time In:1100   Time Out: 1150    Total time: 50     Precautions  Right Upper Extremity Weight-Bearing Status: Full weight-bearing         Subjective   History of Present Illness  Isamar is a 59 y.o. female who reports to physical therapy with a chief concern of right total knee arthroplasty. According to the patient's chart, [email or web address] has a past surgical history that includes Cholecystectomy;  section; Ankle surgery (Left); and Partial hysterectomy ().    The patient reports a medical diagnosis of right total knee arthroplasty.  Patient reports a surgery of right total knee arthroplasty. Surgery occurred on 01/15/25. Diagnostic tests related to this condition: X-ray.        Dominant Hand: Right  History of Present Condition/Illness: Pt had right total knee one week ago     Pain     Patient reports a current pain level of 8/10. Pain at best is reported as 4/10. Pain at worst is reported as 10/10.   Clinical Progression (since onset): Stable  Pain Qualities: Aching, Discomfort, Knife-like, Pulling, Sharp, Tenderness, Tightness, Burning, Dull  Pain-Relieving Factors: Rest, Walking, Standing  Pain-Aggravating Factors: Bending, Twisting, Walking, Stretching, Standing, Straightening         Treatment History  Treatments  Discharged From Past 30 Days: Home  health care  Previously Received Treatments: Yes  Currently Receiving Treatments: No    Living Arrangements  Living Situation  Housing: Home independently  Living Arrangements: Spouse/significant other  Support Systems: Children    Home Setup  Type of Structure: House        Employment  Employment Status: Retired          Past Medical History/Physical Systems Review:   Isamar Osorio  has a past medical history of Hyperlipidemia and Hypertension.    Isamar Osorio  has a past surgical history that includes Cholecystectomy;  section; Ankle surgery (Left); and Partial hysterectomy ().    Isamar has a current medication list which includes the following prescription(s): ergocalciferol, estradiol, famotidine, mupirocin, pantoprazole, rosuvastatin, and telmisartan.    Review of patient's allergies indicates:  No Known Allergies     Objective   Knee Observations  Right Knee Observations  Present: Straight Leg Raise Extensor Lag  Left Knee Observations  Not Present: Straight Leg Raise Extensor Lag             Hip Range of Motion   Right Hip   Active (deg) Passive (deg) Pain   Flexion 90   Yes   Extension         ABduction         ADduction         External Rotation 90/90         External Rotation Prone         Internal Rotation 90/90         Internal Rotation Prone             Left Hip   Active (deg) Passive (deg) Pain   Flexion 100       Extension         ABduction         ADduction         External Rotation 90/90         External Rotation Prone         Internal Rotation 90/90         Internal Rotation Prone                  Knee Range of Motion   Right Knee   Active (deg) Passive (deg) Pain   Flexion 75 100 Yes   Extension -10 -15         Left Knee   Active (deg) Passive (deg) Pain   Flexion 135       Extension 0           Quad lag with terminal knee extension right -25     Ankle/Foot Range of Motion   Right Ankle/Foot   Active (deg) Passive (deg) Pain   Dorsiflexion (KE) 10 10     Dorsiflexion (KF)          Plantar Flexion         Ankle Inversion         Ankle Eversion         Subtalar Inversion         Subtalar Eversion         Great Toe MTP Flexion         Great Toe MTP Extension         Great Toe IP Flexion                              Hip Strength - Planes of Motion   Right Strength Right Pain Left Strength Left  Pain   Flexion (L2) 3+   4+     Extension 3+   4+     ABduction 3+   4+     ADduction 3+   4+     Internal Rotation 3+   4+     External Rotation 3+   4+         Knee Strength   Right Strength Right Pain Left Strength Left  Pain   Flexion (S2) 2+   5     Prone Flexion 2+   5     Extension (L3) 2+   5       Knee Extensor Lag  Lag Present: Right  No Lag: Left       Ankle/Foot Strength - Planes of Motion   Right Strength Right Pain Left Strength Left  Pain   Dorsiflexion (L4) 4   5     Plantar Flexion (S1) 4   5     Inversion 4   5     Eversion 4   5     Great Toe Flexion 4   5     Great Toe Extension (L5) 4   5     Lesser Toes Flexion 4   5     Lesser Toes Extension 4   5               Ambulation Assistance Required  Surface With  Assistive Device Without Assistive Device Details   Level Independent        Uneven Supervision       Curb           Gait Analysis  Gait Pattern: Antalgic                   Intake Outcome Measure for FOTO Survey    Therapist reviewed FOTO scores for Isamar Osorio on 1/24/2025.   FOTO report - see Media section or FOTO account episode details.     Intake Score: 30%    Patient's spiritual, cultural, and educational needs considered and patient agreeable to plan of care and goals.     Assessment & Plan   Assessment  Isamar presents with a condition of Moderate complexity.   Presentation of Symptoms: Evolving  Will Comorbidities Impact Care: Yes  Pt has toe fx in right foot no toe riaises     Functional Limitations: Activity tolerance, Disrupted sleep pattern, Squatting, Standing tolerance, Getting off the floor, Functional mobility, Bed mobility  Impairments: Pain with functional  activity, Impaired balance  Personal Factors Affecting Prognosis: Pain    Prognosis: Good  Prognosis Details: Pt has weakness in quad with decreased knee flexion and extension     Plan  From a physical therapy perspective, the patient would benefit from: Skilled Rehab Services    Planned therapy interventions include: Therapeutic exercise and Therapeutic activities.    Planned modalities to include: Electrical stimulation - passive/unattended.        Visit Frequency: 3 times Per Week for 8 Weeks.       This plan was discussed with Family and Patient.   Discussion participants: Agreed Upon Plan of Care             Goals:   Active       Ambulation/movement       Patient will walk independent without an assistive device        Start:  01/24/25    Expected End:  02/21/25               Ambulation/movement          Pain       Patient will report pain of 4/10 demonstrating a reduction of overall pain       Start:  01/24/25    Expected End:  02/21/25               Pain       Patient will report pain of 1/10 demonstrating a reduction of overall pain       Start:  01/24/25    Expected End:  03/21/25               Range of Motion       Patient will achieve right knee ROM of  flexion -120 degrees        Start:  01/24/25    Expected End:  02/21/25               Range of Motion       Patient will achieve right knee ROM of  ext -0  degrees with quad lag        Start:  01/24/25    Expected End:  03/21/25               Strength       Patient will achieve right knee flexion strength of 3+/5       Start:  01/24/25    Expected End:  02/21/25            Patient will achieve right knee extension strength of 3+/5       Start:  01/24/25    Expected End:  02/21/25               Strength       Patient will achieve right knee flexion strength of 5/5       Start:  01/24/25    Expected End:  03/21/25            Patient will achieve right knee extension strength of 5/5       Start:  01/24/25    Expected End:  03/21/25              Plan   Plan of  care Certification: 1/24/2025 to 3/21/2025 .    Outpatient Physical Therapy 3 times weekly for 8 weeks to include the following interventions: Electrical Stimulation nmes, Manual Therapy, Neuromuscular Re-ed, Patient Education, Therapeutic Activities, and Therapeutic Exercise.     Plan of care has been reestablished with June SMART,     Akash Puentes, PT   1/24/2025          I CERTIFY THE NEED FOR THESE SERVICES FURNISHED UNDER THIS PLAN OF TREATMENT AND WHILE UNDER MY CARE.    Physician's comments:      Physician's Signature: ___________________________________________________

## 2025-01-24 NOTE — TELEPHONE ENCOUNTER
Patient came in to drop off urine & update medication & procedure list.     Right knee replacement Medications  Surgery 01/15/025

## 2025-01-27 ENCOUNTER — CLINICAL SUPPORT (OUTPATIENT)
Dept: REHABILITATION | Facility: HOSPITAL | Age: 60
End: 2025-01-27
Payer: COMMERCIAL

## 2025-01-27 DIAGNOSIS — Z96.651 S/P TOTAL KNEE ARTHROPLASTY, RIGHT: Primary | ICD-10-CM

## 2025-01-27 DIAGNOSIS — M25.661 DECREASED ROM OF RIGHT KNEE: ICD-10-CM

## 2025-01-27 DIAGNOSIS — Z74.09 DECREASED FUNCTIONAL MOBILITY AND ENDURANCE: ICD-10-CM

## 2025-01-27 PROCEDURE — 97112 NEUROMUSCULAR REEDUCATION: CPT | Mod: PN,CQ

## 2025-01-27 PROCEDURE — 97530 THERAPEUTIC ACTIVITIES: CPT | Mod: PN,CQ

## 2025-01-27 PROCEDURE — 97140 MANUAL THERAPY 1/> REGIONS: CPT | Mod: PN,CQ

## 2025-01-27 NOTE — PROGRESS NOTES
Outpatient Rehab    Physical Therapy Visit    Patient Name: Isamar Osorio  MRN: [id number]  YOB: 1965  Today's Date: 2025    Therapy Diagnosis:   Encounter Diagnoses   Name Primary?    S/P total knee arthroplasty, right Yes    Decreased functional mobility and endurance     Decreased ROM of right knee      Physician: Teo Richard,*      Physician Orders: Eval and Treat  Medical Diagnosis: right total knee arthroplasty      Visit # / Visits Authorized:  1 / no limit    Date of Evaluation:  2025   Insurance Authorization Period: 2025  to end of    Plan of Care Certification:  2025 to 3/20/2025    Visit # / Visits Authorized:  2 / no limit    PTA Visit #: 1    Time In: 1005  Time Out: 1043  Total Billable Time: 38 minutes    Precautions: Standard         Subjective   I dont have a good way to do the knee bend stretches at home..  Family / care giver present for this visit:   Pain reported as 3.      Past Medical History/Physical Systems Review:   Isamar Osorio  has a past medical history of Hyperlipidemia and Hypertension.    Isamar Osorio  has a past surgical history that includes Cholecystectomy;  section; Ankle surgery (Left); and Partial hysterectomy ().    Isamar has a current medication list which includes the following prescription(s): aspirin, cephalexin, cyclobenzaprine, docusate sodium, enoxaparin, ergocalciferol, estradiol, famotidine, mupirocin, ondansetron, oxycodone-acetaminophen, pantoprazole, rosuvastatin, and telmisartan.    Review of patient's allergies indicates:  No Known Allergies     Objective       Knee Range of Motion   Right Knee   Active (deg) Passive (deg) Pain   Flexion 88 95     Extension -6           Quad lag with terminal knee extension with terminal knee extension -20           Treatment:  Manual Therapy  Manual Therapy Activity 1: prom to right knee    Balance/Neuromuscular Re-Education  Balance/Neuromuscular Re-Education Activity  1: slant board x 2'  Balance/Neuromuscular Re-Education Activity 2: quad sets x 20  Balance/Neuromuscular Re-Education Activity 3: tke with es x 20 with belt    Therapeutic Activity  Therapeutic Activity 1: swiss ball knee flexion x 20 to promote bending and squatting  Therapeutic Activity 2: sitting heel slides with towel x 15  Therapeutic Activity 3: ankle alphabet x1 to promote ankle stability with walking on unlevel surfaces    Patient's spiritual, cultural, and educational needs considered and patient agreeable to plan of care and goals.     Assessment & Plan   Assessment: patient still has stitches in place, healing well. Patient advised to purchase swissball to assist with hep.  Evaluation/Treatment Tolerance: Patient limited by pain        Plan: From a physical therapy perspective, the patient would benefit from: Skilled Rehab Services     Planned therapy interventions include: Therapeutic exercise and Therapeutic activities.    Planned modalities to include: Electrical stimulation - passive/unattended.         Visit Frequency: 3 times Per Week for 8 Weeks.    Goals:   Active       Ambulation/movement       Patient will walk independent without an assistive device        Start:  01/24/25    Expected End:  02/21/25               Ambulation/movement          Pain       Patient will report pain of 4/10 demonstrating a reduction of overall pain       Start:  01/24/25    Expected End:  02/21/25               Pain       Patient will report pain of 1/10 demonstrating a reduction of overall pain       Start:  01/24/25    Expected End:  03/21/25               Range of Motion       Patient will achieve right knee ROM of  flexion -120 degrees        Start:  01/24/25    Expected End:  02/21/25               Range of Motion       Patient will achieve right knee ROM of  ext -0  degrees with quad lag        Start:  01/24/25    Expected End:  03/21/25               Strength       Patient will achieve right knee flexion  strength of 3+/5       Start:  01/24/25    Expected End:  02/21/25            Patient will achieve right knee extension strength of 3+/5       Start:  01/24/25    Expected End:  02/21/25               Strength       Patient will achieve right knee flexion strength of 5/5       Start:  01/24/25    Expected End:  03/21/25            Patient will achieve right knee extension strength of 5/5       Start:  01/24/25    Expected End:  03/21/25

## 2025-01-28 ENCOUNTER — CLINICAL SUPPORT (OUTPATIENT)
Dept: REHABILITATION | Facility: HOSPITAL | Age: 60
End: 2025-01-28
Payer: COMMERCIAL

## 2025-01-28 DIAGNOSIS — Z96.651 S/P TOTAL KNEE ARTHROPLASTY, RIGHT: Primary | ICD-10-CM

## 2025-01-28 DIAGNOSIS — M25.661 DECREASED ROM OF RIGHT KNEE: ICD-10-CM

## 2025-01-28 DIAGNOSIS — Z74.09 DECREASED FUNCTIONAL MOBILITY AND ENDURANCE: ICD-10-CM

## 2025-01-28 PROCEDURE — 97530 THERAPEUTIC ACTIVITIES: CPT | Mod: PN,CQ

## 2025-01-28 PROCEDURE — 97112 NEUROMUSCULAR REEDUCATION: CPT | Mod: PN,CQ

## 2025-01-28 PROCEDURE — 97140 MANUAL THERAPY 1/> REGIONS: CPT | Mod: PN,CQ

## 2025-01-28 NOTE — PROGRESS NOTES
Outpatient Rehab    Physical Therapy Visit    Patient Name: Isamar Osorio  MRN: [id number]  YOB: 1965  Today's Date: 2025    Therapy Diagnosis:   Encounter Diagnoses   Name Primary?    S/P total knee arthroplasty, right Yes    Decreased functional mobility and endurance     Decreased ROM of right knee      Physician: Teo Richard,*    Physician Orders: Eval and Treat  Date of Evaluation:  2025   Insurance Authorization Period: 2025  to end of    Plan of Care Certification:  2025 to 3/20/2025    Visit # / Visits Authorized:  3 / no limit    PTA VISIT # 2     Time In: 925    Time Out: 1008   Total Billable Time: 43    Subjective   I did get some rest for about 5 hours early this am..  Pain reported as 3.      Past Medical History/Physical Systems Review:   Isamar Osorio  has a past medical history of Hyperlipidemia and Hypertension.    Isamar Osorio  has a past surgical history that includes Cholecystectomy;  section; Ankle surgery (Left); and Partial hysterectomy ().    Isamar has a current medication list which includes the following prescription(s): aspirin, cephalexin, cyclobenzaprine, docusate sodium, enoxaparin, ergocalciferol, estradiol, famotidine, mupirocin, ondansetron, oxycodone-acetaminophen, pantoprazole, rosuvastatin, and telmisartan.    Review of patient's allergies indicates:  No Known Allergies     Objective      Treatment:  Manual Therapy  Manual Therapy Activity 1: prom to right knee    Balance/Neuromuscular Re-Education  Balance/Neuromuscular Re-Education Activity 1: slant board x 2'  Balance/Neuromuscular Re-Education Activity 2: quad sets x 20  Balance/Neuromuscular Re-Education Activity 3: tke with es x 20 with belt  Balance/Neuromuscular Re-Education Activity 4: weight shifting onto r lower extremity x 10    Therapeutic Activity  Therapeutic Activity 1: swiss ball knee flexion x 20 to promote bending and squatting  Therapeutic Activity 2:  sitting heel slides with towel x 15  Therapeutic Activity 4: r forward step toe touches x 20    Patient's spiritual, cultural, and educational needs considered and patient agreeable to plan of care and goals.     Assessment & Plan   Assessment: patient guarded with weight shifting onto r leg, had difficulty with flexion rom this session           Plan:    From a physical therapy perspective, the patient would benefit from: Skilled Rehab Services     Planned therapy interventions include: Therapeutic exercise and Therapeutic activities.    Planned modalities to include: Electrical stimulation - passive/unattended.         Visit Frequency: 3 times Per Week for 8 Weeks.    Goals:   Active       Ambulation/movement       Patient will walk independent without an assistive device        Start:  01/24/25    Expected End:  02/21/25               Ambulation/movement          Pain       Patient will report pain of 4/10 demonstrating a reduction of overall pain       Start:  01/24/25    Expected End:  02/21/25               Pain       Patient will report pain of 1/10 demonstrating a reduction of overall pain       Start:  01/24/25    Expected End:  03/21/25               Range of Motion       Patient will achieve right knee ROM of  flexion -120 degrees        Start:  01/24/25    Expected End:  02/21/25               Range of Motion       Patient will achieve right knee ROM of  ext -0  degrees with quad lag        Start:  01/24/25    Expected End:  03/21/25               Strength       Patient will achieve right knee flexion strength of 3+/5       Start:  01/24/25    Expected End:  02/21/25            Patient will achieve right knee extension strength of 3+/5       Start:  01/24/25    Expected End:  02/21/25               Strength       Patient will achieve right knee flexion strength of 5/5       Start:  01/24/25    Expected End:  03/21/25            Patient will achieve right knee extension strength of 5/5       Start:   01/24/25    Expected End:  03/21/25

## 2025-01-31 ENCOUNTER — CLINICAL SUPPORT (OUTPATIENT)
Dept: REHABILITATION | Facility: HOSPITAL | Age: 60
End: 2025-01-31
Payer: COMMERCIAL

## 2025-01-31 DIAGNOSIS — Z96.651 S/P TOTAL KNEE ARTHROPLASTY, RIGHT: ICD-10-CM

## 2025-01-31 DIAGNOSIS — Z74.09 DECREASED FUNCTIONAL MOBILITY AND ENDURANCE: Primary | ICD-10-CM

## 2025-01-31 DIAGNOSIS — M25.661 DECREASED ROM OF RIGHT KNEE: ICD-10-CM

## 2025-01-31 PROCEDURE — 97112 NEUROMUSCULAR REEDUCATION: CPT | Mod: PN

## 2025-01-31 PROCEDURE — 97530 THERAPEUTIC ACTIVITIES: CPT | Mod: PN

## 2025-01-31 NOTE — PROGRESS NOTES
Outpatient Rehab    Physical Therapy Visit    Patient Name: Isamar Osorio  MRN: [id number]  YOB: 1965  Today's Date: 1/31/2025    Therapy Diagnosis:   Encounter Diagnoses   Name Primary?    Decreased functional mobility and endurance Yes    Decreased ROM of right knee     S/P total knee arthroplasty, right      Physician: Teo Richard,*    Physician Orders: Eval and Treat  Medical Diagnosis: right total knee      Physician Orders: Eval and Treat  Date of Evaluation:  1/24/2025   Insurance Authorization Period: 1/1/2025  to end of year   Plan of Care Certification:  1/24/2025 to 3/20/2025    Visit # / Visits Authorized:  4 / no limit    PTA VISIT #    Time In: 1005   Time Out: 1100  Total Time: 55   Total Billable Time: 55         Subjective   pt states she is improving.  Family / care giver present for this visit:   Pain reported as 2.      Objective       Knee Range of Motion   Right Knee   Active (deg) Passive (deg) Pain   Flexion 100 115 Yes   Extension -4           Quad lag with terminal knee extension  -15           Treatment:       Balance/Neuromuscular Re-Education  Balance/Neuromuscular Re-Education Activity 1: terminal knee extension with estim x 25 reps  Balance/Neuromuscular Re-Education Activity 2: quad sets with estim   x 25 reps  Balance/Neuromuscular Re-Education Activity 3: straight leg raise x 15 reps  with estim  Balance/Neuromuscular Re-Education Activity 4: slant board x 2 min    Therapeutic Activity  Therapeutic Activity 1: biking x 5 min  Therapeutic Activity 2: double support leg press with 70lb x  20 reps to help with squatting and bending  Therapeutic Activity 3: double support calf pressing with 70lb x 20 reps for toe raising to reach into cabinets  Therapeutic Activity 4: total gym leg pressing simulating getting up from kneeling level 10 x 20 reps  Therapeutic Activity 5: total gym calf pressing simulaitng toe raising to reach into cabinets x 10 reps with level  10  Therapeutic Activity 6: ball squats  squatting and stooping x 10 reps  - not today  Therapeutic Activity 7: wall slides for squatting x 10 reps  - not today    Patient's spiritual, cultural, and educational needs considered and patient agreeable to plan of care and goals.     Assessment & Plan   Assessment: pt had improvement withtom and strength today  Evaluation/Treatment Tolerance: Patient tolerated treatment well        Plan:  cont PT plan of care from eval .  Plan of care has been reestablished with June SMART      Goals:   Active       Ambulation/movement       Patient will walk independent without an assistive device        Start:  01/24/25    Expected End:  02/21/25               Ambulation/movement          Pain       Patient will report pain of 4/10 demonstrating a reduction of overall pain       Start:  01/24/25    Expected End:  02/21/25               Pain       Patient will report pain of 1/10 demonstrating a reduction of overall pain       Start:  01/24/25    Expected End:  03/21/25               Range of Motion       Patient will achieve right knee ROM of  flexion -120 degrees        Start:  01/24/25    Expected End:  02/21/25               Range of Motion       Patient will achieve right knee ROM of  ext -0  degrees with quad lag        Start:  01/24/25    Expected End:  03/21/25               Strength       Patient will achieve right knee flexion strength of 3+/5       Start:  01/24/25    Expected End:  02/21/25            Patient will achieve right knee extension strength of 3+/5       Start:  01/24/25    Expected End:  02/21/25               Strength       Patient will achieve right knee flexion strength of 5/5       Start:  01/24/25    Expected End:  03/21/25            Patient will achieve right knee extension strength of 5/5       Start:  01/24/25    Expected End:  03/21/25

## 2025-02-03 ENCOUNTER — CLINICAL SUPPORT (OUTPATIENT)
Dept: REHABILITATION | Facility: HOSPITAL | Age: 60
End: 2025-02-03
Payer: COMMERCIAL

## 2025-02-03 DIAGNOSIS — Z96.651 S/P TOTAL KNEE ARTHROPLASTY, RIGHT: ICD-10-CM

## 2025-02-03 DIAGNOSIS — Z74.09 DECREASED FUNCTIONAL MOBILITY AND ENDURANCE: Primary | ICD-10-CM

## 2025-02-03 DIAGNOSIS — M25.661 DECREASED ROM OF RIGHT KNEE: ICD-10-CM

## 2025-02-03 PROCEDURE — 97530 THERAPEUTIC ACTIVITIES: CPT | Mod: PN,CQ

## 2025-02-03 PROCEDURE — 97112 NEUROMUSCULAR REEDUCATION: CPT | Mod: PN,CQ

## 2025-02-03 NOTE — PROGRESS NOTES
Outpatient Rehab    Physical Therapy Visit    Patient Name: Isamar Osorio  MRN: [id number]  YOB: 1965  Today's Date: 2/3/2025    Therapy Diagnosis:   Encounter Diagnoses   Name Primary?    Decreased functional mobility and endurance Yes    Decreased ROM of right knee     S/P total knee arthroplasty, right      Physician: Teo Richard,*    Physician Orders: Eval and Treat  Medical Diagnosis: right total knee       Physician Orders: Eval and Treat  Date of Evaluation:  1/24/2025   Insurance Authorization Period: 1/1/2025  to end of year   Plan of Care Certification:  1/24/2025 to 3/20/2025    Visit # / Visits Authorized:  5 / no limit    PTA VISIT #  1      Time In:  930   Time Out:  1015  Total Time:     Total Billable Time: 45         Subjective             Objective            Treatment:       Balance/Neuromuscular Re-Education  Balance/Neuromuscular Re-Education Activity 1: terminal knee extension with estim x 25 reps  Balance/Neuromuscular Re-Education Activity 2: quad sets with estim   x 25 reps  Balance/Neuromuscular Re-Education Activity 4: slant board x 2 min  Balance/Neuromuscular Re-Education Activity 5: swissball knee stretch with towel x 10    Therapeutic Activity  Therapeutic Activity 1: biking x 5 min seat level 8  Therapeutic Activity 2: double support leg press with 70lb x  20 reps to help with squatting and bending  Therapeutic Activity 3: double support calf pressing with 70lb x 20 reps for toe raising to reach into cabinets  Therapeutic Activity 4: total gym leg pressing simulating getting up from kneeling level 10 x 20 reps  Therapeutic Activity 5: total gym calf pressing simulaitng toe raising to reach into cabinets x 10 reps with level 10  Therapeutic Activity 8: standing calf raises x 15 to simulate reaching on tip toes    Patient's spiritual, cultural, and educational needs considered and patient agreeable to plan of care and goals.     Assessment & Plan   Assessment:              Plan:      Goals:   Active       Ambulation/movement       Patient will walk independent without an assistive device        Start:  01/24/25    Expected End:  02/21/25               Ambulation/movement          Pain       Patient will report pain of 4/10 demonstrating a reduction of overall pain       Start:  01/24/25    Expected End:  02/21/25               Pain       Patient will report pain of 1/10 demonstrating a reduction of overall pain       Start:  01/24/25    Expected End:  03/21/25               Range of Motion       Patient will achieve right knee ROM of  flexion -120 degrees        Start:  01/24/25    Expected End:  02/21/25               Range of Motion       Patient will achieve right knee ROM of  ext -0  degrees with quad lag        Start:  01/24/25    Expected End:  03/21/25               Strength       Patient will achieve right knee flexion strength of 3+/5       Start:  01/24/25    Expected End:  02/21/25            Patient will achieve right knee extension strength of 3+/5       Start:  01/24/25    Expected End:  02/21/25               Strength       Patient will achieve right knee flexion strength of 5/5       Start:  01/24/25    Expected End:  03/21/25            Patient will achieve right knee extension strength of 5/5       Start:  01/24/25    Expected End:  03/21/25

## 2025-02-05 ENCOUNTER — CLINICAL SUPPORT (OUTPATIENT)
Dept: REHABILITATION | Facility: HOSPITAL | Age: 60
End: 2025-02-05
Payer: COMMERCIAL

## 2025-02-05 DIAGNOSIS — Z74.09 DECREASED FUNCTIONAL MOBILITY AND ENDURANCE: ICD-10-CM

## 2025-02-05 DIAGNOSIS — Z96.651 S/P TOTAL KNEE ARTHROPLASTY, RIGHT: Primary | ICD-10-CM

## 2025-02-05 DIAGNOSIS — M25.661 DECREASED ROM OF RIGHT KNEE: ICD-10-CM

## 2025-02-05 PROCEDURE — 97530 THERAPEUTIC ACTIVITIES: CPT | Mod: PN,CQ

## 2025-02-05 PROCEDURE — 97112 NEUROMUSCULAR REEDUCATION: CPT | Mod: PN,CQ

## 2025-02-05 NOTE — PROGRESS NOTES
Outpatient Rehab    Physical Therapy Visit    Patient Name: Isamar Osorio  MRN: [id number]  YOB: 1965  Today's Date: 2/5/2025    Therapy Diagnosis:   Encounter Diagnoses   Name Primary?    S/P total knee arthroplasty, right Yes    Decreased functional mobility and endurance     Decreased ROM of right knee        Physician: Teo Richard,*     Physician Orders: Eval and Treat  Medical Diagnosis: right total knee       Physician Orders: Eval and Treat  Date of Evaluation:  1/24/2025   Insurance Authorization Period: 1/1/2025  to end of year   Plan of Care Certification:  1/24/2025 to 3/20/2025    Visit # / Visits Authorized:  6 / no limit    PTA VISIT #  2/5    Time In: 0930   Time Out: 1030  Total Time: 60   Total Billable Time: 43 billable minutes spent one on one          Subjective   No pain really. Just stiffness lamonte when i sit for long periods of time..  Pain reported as 1/10.      Objective       Knee Range of Motion   Right Knee   Active (deg) Passive (deg) Pain   Flexion 100       Extension -2                       Treatment:  Balance/Neuromuscular Re-Education  Balance/Neuromuscular Re-Education Activity 1: terminal knee extension with estim x 25 reps  Balance/Neuromuscular Re-Education Activity 2: quad sets with estim x 25 reps  Balance/Neuromuscular Re-Education Activity 3: straight leg raise x 15 reps using strap today  Balance/Neuromuscular Re-Education Activity 4: slant board x 2 min  Balance/Neuromuscular Re-Education Activity 5: heel slides using towel 10 x 5 s/h using strap    Therapeutic Activity  Therapeutic Activity 1: biking x 5 min seat level 8  Therapeutic Activity 2: double support leg press with 70lb x  20 reps to help with squatting and bending  Therapeutic Activity 3: double support calf pressing with 70lb x 20 reps for toe raising to reach into cabinets  Therapeutic Activity 4: total gym leg pressing simulating getting up from kneeling level 10 x 20 reps  Therapeutic  Activity 5: total gym calf pressing simulaitng toe raising to reach into cabinets x 10 reps with level 10  Therapeutic Activity 8: standing calf raises x 15 to simulate reaching on tip toes    Patient's spiritual, cultural, and educational needs considered and patient agreeable to plan of care and goals.     Assessment & Plan   Assessment: Recieved POC from Akash Puentes PT. Today, pt arrived with 1/10 knee pain reporting she still having difficulty sleeping at night waking up every couple hours or so. She is concerned with the swelling and plans to contact her dr to see about anti-imflammatory meds to help with this. Overall, pt's AROM improved with flexion from 92 deg to 100 deg with/without strap. She reports same pain level as prior to PT. Will continue to progress as needed.  Evaluation/Treatment Tolerance: Patient tolerated treatment well        Plan: Continue with current POC.    Goals:   Active       Ambulation/movement       Patient will walk independent without an assistive device  (Progressing)       Start:  01/24/25    Expected End:  02/21/25               Ambulation/movement          Pain       Patient will report pain of 4/10 demonstrating a reduction of overall pain (Progressing)       Start:  01/24/25    Expected End:  02/21/25               Pain       Patient will report pain of 1/10 demonstrating a reduction of overall pain (Progressing)       Start:  01/24/25    Expected End:  03/21/25               Range of Motion       Patient will achieve right knee ROM of  flexion -120 degrees  (Progressing)       Start:  01/24/25    Expected End:  02/21/25               Range of Motion       Patient will achieve right knee ROM of  ext -0  degrees with quad lag  (Progressing)       Start:  01/24/25    Expected End:  03/21/25               Strength       Patient will achieve right knee flexion strength of 3+/5 (Progressing)       Start:  01/24/25    Expected End:  02/21/25            Patient will achieve right  knee extension strength of 3+/5 (Progressing)       Start:  01/24/25    Expected End:  02/21/25               Strength       Patient will achieve right knee flexion strength of 5/5 (Progressing)       Start:  01/24/25    Expected End:  03/21/25            Patient will achieve right knee extension strength of 5/5 (Progressing)       Start:  01/24/25    Expected End:  03/21/25                Jocelyne Colunga PTA

## 2025-02-07 ENCOUNTER — CLINICAL SUPPORT (OUTPATIENT)
Dept: REHABILITATION | Facility: HOSPITAL | Age: 60
End: 2025-02-07
Payer: COMMERCIAL

## 2025-02-07 DIAGNOSIS — Z96.651 S/P TOTAL KNEE ARTHROPLASTY, RIGHT: Primary | ICD-10-CM

## 2025-02-07 DIAGNOSIS — Z74.09 DECREASED FUNCTIONAL MOBILITY AND ENDURANCE: ICD-10-CM

## 2025-02-07 DIAGNOSIS — M25.661 DECREASED ROM OF RIGHT KNEE: ICD-10-CM

## 2025-02-07 PROCEDURE — 97112 NEUROMUSCULAR REEDUCATION: CPT | Mod: PN,CQ

## 2025-02-07 PROCEDURE — 97530 THERAPEUTIC ACTIVITIES: CPT | Mod: PN,CQ

## 2025-02-07 NOTE — PROGRESS NOTES
Outpatient Rehab    Physical Therapy Visit    Patient Name: Isamar Osorio  MRN: [id number]  YOB: 1965  Today's Date: 2/7/2025    Therapy Diagnosis:   Encounter Diagnoses   Name Primary?    S/P total knee arthroplasty, right Yes    Decreased functional mobility and endurance     Decreased ROM of right knee      Physician: Teo Richard,*    Physician Orders: Eval and Treat    Medical Diagnosis: right total knee       hysician Orders: Eval and Treat  Date of Evaluation:  1/24/2025   Insurance Authorization Period: 1/1/2025  to end of year   Plan of Care Certification:  1/24/2025 to 3/20/2025    Visit # / Visits Authorized:  7/no limit    PTA VISIT #  3     Time In:   930  Time Out:  1025  Total Time:     Total Billable Time: 55         Subjective   I walked a good bit yesterday, just a little stiff.  Family / care giver present for this visit:   Pain reported as 1/10.      Objective       Knee Range of Motion   Right Knee   Active (deg) Passive (deg) Pain   Flexion 111       Extension -1           Quad lag with terminal knee extension -12         Treatment:  Balance/Neuromuscular Re-Education  Balance/Neuromuscular Re-Education Activity 1: terminal knee extension with estim x 25 reps  Balance/Neuromuscular Re-Education Activity 2: quad sets with estim x 25 reps  Balance/Neuromuscular Re-Education Activity 4: slant board x 2 min    Therapeutic Activity  Therapeutic Activity 1: biking x 8 min seat level 5  Therapeutic Activity 2: double support leg press with 70lb x  25 reps to help with squatting and bending  Therapeutic Activity 3: double support calf pressing with 70lb x 20 reps for toe raising to reach into cabinets  Therapeutic Activity 4: total gym leg pressing simulating getting up from kneeling level 10 x 20 reps  Therapeutic Activity 5: total gym calf pressing simulaitng toe raising to reach into cabinets x 10 reps with level 10  Therapeutic Activity 9: treadmill 1.1mph x 6' to promote  endurance with walking and functional mobility    Patient's spiritual, cultural, and educational needs considered and patient agreeable to plan of care and goals.     Assessment & Plan   Assessment: Pat had marked improvement with knee flexion +11*, pat able to perform bike at level 5 versus 8  Evaluation/Treatment Tolerance: Patient tolerated treatment well        Plan:  From a physical therapy perspective, the patient would benefit from: Skilled Rehab Services     Planned therapy interventions include: Therapeutic exercise and Therapeutic activities.    Planned modalities to include: Electrical stimulation - passive/unattended.         Visit Frequency: 3 times Per Week for 8 Weeks.    Goals:   Active       Ambulation/movement       Patient will walk independent without an assistive device  (Progressing)       Start:  01/24/25    Expected End:  02/21/25               Ambulation/movement          Pain       Patient will report pain of 4/10 demonstrating a reduction of overall pain (Progressing)       Start:  01/24/25    Expected End:  02/21/25               Pain       Patient will report pain of 1/10 demonstrating a reduction of overall pain (Progressing)       Start:  01/24/25    Expected End:  03/21/25               Range of Motion       Patient will achieve right knee ROM of  flexion -120 degrees  (Progressing)       Start:  01/24/25    Expected End:  02/21/25               Range of Motion       Patient will achieve right knee ROM of  ext -0  degrees with quad lag  (Progressing)       Start:  01/24/25    Expected End:  03/21/25               Strength       Patient will achieve right knee flexion strength of 3+/5 (Progressing)       Start:  01/24/25    Expected End:  02/21/25            Patient will achieve right knee extension strength of 3+/5 (Progressing)       Start:  01/24/25    Expected End:  02/21/25               Strength       Patient will achieve right knee flexion strength of 5/5 (Progressing)        Start:  01/24/25    Expected End:  03/21/25            Patient will achieve right knee extension strength of 5/5 (Progressing)       Start:  01/24/25    Expected End:  03/21/25                Rajni Bond, PTA

## 2025-02-10 ENCOUNTER — CLINICAL SUPPORT (OUTPATIENT)
Dept: REHABILITATION | Facility: HOSPITAL | Age: 60
End: 2025-02-10
Payer: COMMERCIAL

## 2025-02-10 DIAGNOSIS — Z74.09 DECREASED FUNCTIONAL MOBILITY AND ENDURANCE: ICD-10-CM

## 2025-02-10 DIAGNOSIS — M25.661 DECREASED ROM OF RIGHT KNEE: ICD-10-CM

## 2025-02-10 DIAGNOSIS — Z96.651 S/P TOTAL KNEE ARTHROPLASTY, RIGHT: Primary | ICD-10-CM

## 2025-02-10 PROCEDURE — 97530 THERAPEUTIC ACTIVITIES: CPT | Mod: PN

## 2025-02-10 PROCEDURE — 97112 NEUROMUSCULAR REEDUCATION: CPT | Mod: PN

## 2025-02-10 NOTE — PROGRESS NOTES
Outpatient Rehab    Physical Therapy Visit    Patient Name: Isamar Osorio  MRN: [id number]  YOB: 1965  Today's Date: 2/10/2025    Therapy Diagnosis:   Encounter Diagnoses   Name Primary?    S/P total knee arthroplasty, right Yes    Decreased functional mobility and endurance     Decreased ROM of right knee      Physician: Teo Richard,*    Physician Orders: Eval and Treat      hysician Orders: Eval and Treat  Date of Evaluation:  1/24/2025   Insurance Authorization Period: 1/1/2025  to end of year   Plan of Care Certification:  1/24/2025 to 3/20/2025    Visit # / Visits Authorized:  8//no limit    PTA VISIT #       Time In: 0925   Time Out: 1025  Total Time: 60   Total Billable Time: 45         Subjective   pt voices she seems to be loosening up some.  Pain reported as 3/10.      Objective       Knee Range of Motion   Right Knee   Active (deg) Passive (deg) Pain   Flexion 115 120     Extension -3           Quad lag with terminal knee extension -10            Treatment:       Manual Therapy  Manual Therapy Activity 1: prom to right knee x 8 min    Balance/Neuromuscular Re-Education  Balance/Neuromuscular Re-Education Activity 1: terminal knee extension with estim x 25 reps  Balance/Neuromuscular Re-Education Activity 2: quad sets with estim x 25 reps  Balance/Neuromuscular Re-Education Activity 3: straight leg raise x 15 reps using strap today  Balance/Neuromuscular Re-Education Activity 4: slant board x 2 min  Balance/Neuromuscular Re-Education Activity 5: heel slides using towel 10 x 5 s/h using strap    Therapeutic Activity  Therapeutic Activity 1: biking x 8 min seat level 5  Therapeutic Activity 2: double support leg press with 70lb x  25 reps to help with squatting and bending  Therapeutic Activity 3: double support calf pressing with 70lb x 20 reps for toe raising to reach into cabinets  Therapeutic Activity 4: total gym leg pressing simulating getting up from kneeling level 10 x 20  reps  Therapeutic Activity 5: total gym calf pressing simulaitng toe raising to reach into cabinets x 10 reps with level 10  Therapeutic Activity 6: ball squats  squatting and stooping x 10 reps  - not today  Therapeutic Activity 7: wall slides for squatting x 10 reps  - not today  Therapeutic Activity 8: standing calf raises x 15 to simulate reaching on tip toes  Therapeutic Activity 9: treadmill 1.1mph x 6' to promote endurance with walking and functional mobility    Patient's spiritual, cultural, and educational needs considered and patient agreeable to plan of care and goals.     Assessment & Plan   Assessment: pt improving with rom and strength           Plan: Continue with current POC.    Goals:   Active       Ambulation/movement       Patient will walk independent without an assistive device  (Progressing)       Start:  01/24/25    Expected End:  02/21/25               Ambulation/movement          Pain       Patient will report pain of 4/10 demonstrating a reduction of overall pain (Progressing)       Start:  01/24/25    Expected End:  02/21/25               Pain       Patient will report pain of 1/10 demonstrating a reduction of overall pain (Progressing)       Start:  01/24/25    Expected End:  03/21/25               Range of Motion       Patient will achieve right knee ROM of  flexion -120 degrees  (Progressing)       Start:  01/24/25    Expected End:  02/21/25               Range of Motion       Patient will achieve right knee ROM of  ext -0  degrees with quad lag  (Progressing)       Start:  01/24/25    Expected End:  03/21/25               Strength       Patient will achieve right knee flexion strength of 3+/5 (Progressing)       Start:  01/24/25    Expected End:  02/21/25            Patient will achieve right knee extension strength of 3+/5 (Progressing)       Start:  01/24/25    Expected End:  02/21/25               Strength       Patient will achieve right knee flexion strength of 5/5 (Progressing)        Start:  01/24/25    Expected End:  03/21/25            Patient will achieve right knee extension strength of 5/5 (Progressing)       Start:  01/24/25    Expected End:  03/21/25              Outpatient Rehab    Physical Therapy Visit    Patient Name: Isamar Osorio  MRN: [id number]  YOB: 1965  Today's Date: 2/10/2025    Therapy Diagnosis:   Encounter Diagnoses   Name Primary?    S/P total knee arthroplasty, right Yes    Decreased functional mobility and endurance     Decreased ROM of right knee      Physician: Teo Richard,*    Physician Orders: Eval and Treat    Medical Diagnosis: right total knee       hysician Orders: Eval and Treat  Date of Evaluation:  1/24/2025   Insurance Authorization Period: 1/1/2025  to end of year   Plan of Care Certification:  1/24/2025 to 3/20/2025    Visit # / Visits Authorized:  7/no limit    PTA VISIT #  3     Time In: 0926 930  Time Out: [id number]  Total Time: 60   Total Billable Time: 55         Subjective   pt voices she seems to be loosening up some.    Pain reported as 3/10.      Objective       Knee Range of Motion   Right Knee   Active (deg) Passive (deg) Pain   Flexion 115 120     Extension -3           Quad lag with terminal knee extension -10          Treatment:  Balance/Neuromuscular Re-Education  Balance/Neuromuscular Re-Education Activity 1: terminal knee extension with estim x 25 reps  Balance/Neuromuscular Re-Education Activity 2: quad sets with estim x 25 reps  Balance/Neuromuscular Re-Education Activity 3: straight leg raise x 15 reps using strap today  Balance/Neuromuscular Re-Education Activity 4: slant board x 2 min  Balance/Neuromuscular Re-Education Activity 5: heel slides using towel 10 x 5 s/h using strap    Therapeutic Activity  Therapeutic Activity 1: biking x 8 min seat level 5  Therapeutic Activity 2: double support leg press with 70lb x  25 reps to help with squatting and bending  Therapeutic Activity 3: double support calf  pressing with 70lb x 20 reps for toe raising to reach into cabinets  Therapeutic Activity 4: total gym leg pressing simulating getting up from kneeling level 10 x 20 reps  Therapeutic Activity 5: total gym calf pressing simulaitng toe raising to reach into cabinets x 10 reps with level 10  Therapeutic Activity 6: ball squats  squatting and stooping x 10 reps  - not today  Therapeutic Activity 7: wall slides for squatting x 10 reps  - not today  Therapeutic Activity 8: standing calf raises x 15 to simulate reaching on tip toes  Therapeutic Activity 9: treadmill 1.1mph x 6' to promote endurance with walking and functional mobility    Patient's spiritual, cultural, and educational needs considered and patient agreeable to plan of care and goals.     Assessment & Plan   Assessment: pt improving with rom and strength           Plan: Continue with current POC.From a physical therapy perspective, the patient would benefit from: Skilled Rehab Services     Planned therapy interventions include: Therapeutic exercise and Therapeutic activities.    Planned modalities to include: Electrical stimulation - passive/unattended.         Visit Frequency: 3 times Per Week for 8 Weeks.    Goals:   Active       Ambulation/movement       Patient will walk independent without an assistive device  (Progressing)       Start:  01/24/25    Expected End:  02/21/25               Ambulation/movement          Pain       Patient will report pain of 4/10 demonstrating a reduction of overall pain (Progressing)       Start:  01/24/25    Expected End:  02/21/25               Pain       Patient will report pain of 1/10 demonstrating a reduction of overall pain (Progressing)       Start:  01/24/25    Expected End:  03/21/25               Range of Motion       Patient will achieve right knee ROM of  flexion -120 degrees  (Progressing)       Start:  01/24/25    Expected End:  02/21/25               Range of Motion       Patient will achieve right knee ROM  of  ext -0  degrees with quad lag  (Progressing)       Start:  01/24/25    Expected End:  03/21/25               Strength       Patient will achieve right knee flexion strength of 3+/5 (Progressing)       Start:  01/24/25    Expected End:  02/21/25            Patient will achieve right knee extension strength of 3+/5 (Progressing)       Start:  01/24/25    Expected End:  02/21/25               Strength       Patient will achieve right knee flexion strength of 5/5 (Progressing)       Start:  01/24/25    Expected End:  03/21/25            Patient will achieve right knee extension strength of 5/5 (Progressing)       Start:  01/24/25    Expected End:  03/21/25                 Plan   Plan of care Certification  1/24/2025 to 3/20/2025      Outpatient Physical Therapy 2 times weekly for 8 weeks to include the following interventions: Electrical Stimulation nmes, Manual Therapy, Neuromuscular Re-ed, Patient Education, and Therapeutic Activities.     Plan of care has been reestablished with June [facility],       Akash Puentes, PT

## 2025-02-12 ENCOUNTER — CLINICAL SUPPORT (OUTPATIENT)
Dept: REHABILITATION | Facility: HOSPITAL | Age: 60
End: 2025-02-12
Payer: COMMERCIAL

## 2025-02-12 DIAGNOSIS — M25.661 DECREASED ROM OF RIGHT KNEE: ICD-10-CM

## 2025-02-12 DIAGNOSIS — Z96.651 S/P TOTAL KNEE ARTHROPLASTY, RIGHT: Primary | ICD-10-CM

## 2025-02-12 DIAGNOSIS — Z74.09 DECREASED FUNCTIONAL MOBILITY AND ENDURANCE: ICD-10-CM

## 2025-02-12 PROCEDURE — 97112 NEUROMUSCULAR REEDUCATION: CPT | Mod: PN,CQ

## 2025-02-12 PROCEDURE — 97530 THERAPEUTIC ACTIVITIES: CPT | Mod: PN,CQ

## 2025-02-12 NOTE — PROGRESS NOTES
Outpatient Rehab    Physical Therapy Visit    Patient Name: Isamar Osorio  MRN: [id number]  YOB: 1965  Today's Date: 2/12/2025    Therapy Diagnosis:   Encounter Diagnoses   Name Primary?    S/P total knee arthroplasty, right Yes    Decreased functional mobility and endurance     Decreased ROM of right knee      Physician: Teo Richard,*    Physician Orders: Eval and Treat     S/P total knee arthroplasty, right Yes    Decreased functional mobility and endurance      Decreased ROM of right knee        Physician: Teo Richard,*     Physician Orders: Eval and Treat  Date of Evaluation:  1/24/2025   Insurance Authorization Period: 1/1/2025  to end of year   Plan of Care Certification:  1/24/2025 to 3/20/2025    Visit # / Visits Authorized:  9/no limit    PTA VISIT #  1                 Time In: 930    Time Out:  1010  Total Time:     Total Billable Time: 40    Subjective   I'm not really hurting, but I'm stiff and have a 'catch' in the back of my knee this am..  Family / care giver present for this visit:   Pain reported as 1/10.      Objective       Knee Range of Motion   Right Knee   Active (deg) Passive (deg) Pain   Flexion 113       Extension -3           Quad lag with terminal knee extension -10         Treatment:  Balance/Neuromuscular Re-Education  Balance/Neuromuscular Re-Education Activity 1: terminal knee extension with estim x 25 reps  Balance/Neuromuscular Re-Education Activity 2: quad sets with estim x 25 reps  Balance/Neuromuscular Re-Education Activity 4: slant board x 2 min    Therapeutic Activity  Therapeutic Activity 1: biking x 8 min seat level 5  Therapeutic Activity 4: total gym leg pressing simulating getting up from kneeling level 10 x 20 reps  Therapeutic Activity 5: total gym calf pressing simulaitng toe raising to reach into cabinets x 10 reps with level 10  Therapeutic Activity 7: r ss leg presses 20 x 50# to simulate bending and squatting  Therapeutic Activity  8: r ss calf presses 20 x 50# to simulate reaching on tip toes    Assessment & Plan   Assessment:         Patient will continue to benefit from skilled outpatient physical therapy to address the deficits listed in the problem list box on initial evaluation, provide pt/family education and to maximize pt's level of independence in the home and community environment.     Patient's spiritual, cultural, and educational needs considered and patient agreeable to plan of care and goals.           Plan:      Goals:   Active       Ambulation/movement       Patient will walk independent without an assistive device  (Progressing)       Start:  01/24/25    Expected End:  02/21/25               Ambulation/movement          Pain       Patient will report pain of 4/10 demonstrating a reduction of overall pain (Progressing)       Start:  01/24/25    Expected End:  02/21/25               Pain       Patient will report pain of 1/10 demonstrating a reduction of overall pain (Progressing)       Start:  01/24/25    Expected End:  03/21/25               Range of Motion       Patient will achieve right knee ROM of  flexion -120 degrees  (Progressing)       Start:  01/24/25    Expected End:  02/21/25               Range of Motion       Patient will achieve right knee ROM of  ext -0  degrees with quad lag  (Progressing)       Start:  01/24/25    Expected End:  03/21/25               Strength       Patient will achieve right knee flexion strength of 3+/5 (Progressing)       Start:  01/24/25    Expected End:  02/21/25            Patient will achieve right knee extension strength of 3+/5 (Progressing)       Start:  01/24/25    Expected End:  02/21/25               Strength       Patient will achieve right knee flexion strength of 5/5 (Progressing)       Start:  01/24/25    Expected End:  03/21/25            Patient will achieve right knee extension strength of 5/5 (Progressing)       Start:  01/24/25    Expected End:  03/21/25                 Rajni Bond, PTA

## 2025-02-14 ENCOUNTER — CLINICAL SUPPORT (OUTPATIENT)
Dept: REHABILITATION | Facility: HOSPITAL | Age: 60
End: 2025-02-14
Payer: COMMERCIAL

## 2025-02-14 DIAGNOSIS — M25.661 DECREASED ROM OF RIGHT KNEE: ICD-10-CM

## 2025-02-14 DIAGNOSIS — Z96.651 S/P TOTAL KNEE ARTHROPLASTY, RIGHT: Primary | ICD-10-CM

## 2025-02-14 DIAGNOSIS — Z74.09 DECREASED FUNCTIONAL MOBILITY AND ENDURANCE: ICD-10-CM

## 2025-02-14 PROCEDURE — 97112 NEUROMUSCULAR REEDUCATION: CPT | Mod: PN,CQ

## 2025-02-14 PROCEDURE — 97530 THERAPEUTIC ACTIVITIES: CPT | Mod: PN,CQ

## 2025-02-14 NOTE — PROGRESS NOTES
Outpatient Rehab    Physical Therapy Visit    Patient Name: Isamar Osorio  MRN: [id number]  YOB: 1965  Today's Date: 2/14/2025    Therapy Diagnosis:   Encounter Diagnoses   Name Primary?    S/P total knee arthroplasty, right Yes    Decreased functional mobility and endurance     Decreased ROM of right knee      Physician: Teo Richard,*    Physician Orders: Eval and Treat  Date of Evaluation:  1/24/2025   Insurance Authorization Period: 1/1/2025  to end of year   Plan of Care Certification:  1/24/2025 to 3/20/2025    Visit # / Visits Authorized:  10/no limit    PTA VISIT #  2                               Time In:   908  Time Out:  1003  Total Time:     Total Billable Time: 55    Subjective   My blood pressure was up this am, but I've taken meds so headache is better. I'm still having some popping behind my knee when I bend it..  Family / care giver present for this visit:   Pain reported as 0/10.      Objective        Treatment:  Balance/Neuromuscular Re-Education  Balance/Neuromuscular Re-Education Activity 1: terminal knee extension with estim x 25 reps  Balance/Neuromuscular Re-Education Activity 2: quad sets with estim x 25 reps  Balance/Neuromuscular Re-Education Activity 4: slant board x 2 min    Therapeutic Activity  Therapeutic Activity 1: biking x 9 min seat level 5  Therapeutic Activity 2: double support leg press with 70lb x  25 reps to help with squatting and bending  Therapeutic Activity 3: double support calf pressing with 70lb x 20 reps for toe raising to reach into cabinets  Therapeutic Activity 4: total gym leg pressing simulating getting up from kneeling level 10 x 20 reps  Therapeutic Activity 5: total gym calf pressing simulaitng toe raising to reach into cabinets x 10 reps with level 10  Therapeutic Activity 6: b hip abd, add 10 x 20# to promote pelvic stability with walking  Therapeutic Activity 7: swissball knee flextion with towel x 15 to promote functional flexion  for walking    Assessment & Plan   Assessment: Pat fatigued quickly with inclusion of hip ex's, progressing gradually with rom  Evaluation/Treatment Tolerance: Patient limited by fatigue    Patient will continue to benefit from skilled outpatient physical therapy to address the deficits listed in the problem list box on initial evaluation, provide pt/family education and to maximize pt's level of independence in the home and community environment.     Patient's spiritual, cultural, and educational needs considered and patient agreeable to plan of care and goals.           Plan:     Planned therapy interventions include: Therapeutic exercise and Therapeutic activities.    Planned modalities to include: Electrical stimulation - passive/unattended.         Visit Frequency: 3 times Per Week for 8 Weeks.    Goals:   Active       Ambulation/movement       Patient will walk independent without an assistive device  (Progressing)       Start:  01/24/25    Expected End:  02/21/25               Ambulation/movement          Pain       Patient will report pain of 4/10 demonstrating a reduction of overall pain (Progressing)       Start:  01/24/25    Expected End:  02/21/25               Pain       Patient will report pain of 1/10 demonstrating a reduction of overall pain (Progressing)       Start:  01/24/25    Expected End:  03/21/25               Range of Motion       Patient will achieve right knee ROM of  flexion -120 degrees  (Progressing)       Start:  01/24/25    Expected End:  02/21/25               Range of Motion       Patient will achieve right knee ROM of  ext -0  degrees with quad lag  (Progressing)       Start:  01/24/25    Expected End:  03/21/25               Strength       Patient will achieve right knee flexion strength of 3+/5 (Progressing)       Start:  01/24/25    Expected End:  02/21/25            Patient will achieve right knee extension strength of 3+/5 (Progressing)       Start:  01/24/25    Expected End:   02/21/25               Strength       Patient will achieve right knee flexion strength of 5/5 (Progressing)       Start:  01/24/25    Expected End:  03/21/25            Patient will achieve right knee extension strength of 5/5 (Progressing)       Start:  01/24/25    Expected End:  03/21/25                Rajni Bond, PTA

## 2025-02-17 ENCOUNTER — CLINICAL SUPPORT (OUTPATIENT)
Dept: REHABILITATION | Facility: HOSPITAL | Age: 60
End: 2025-02-17
Payer: COMMERCIAL

## 2025-02-17 DIAGNOSIS — M25.661 DECREASED ROM OF RIGHT KNEE: ICD-10-CM

## 2025-02-17 DIAGNOSIS — Z96.651 S/P TOTAL KNEE ARTHROPLASTY, RIGHT: Primary | ICD-10-CM

## 2025-02-17 DIAGNOSIS — Z74.09 DECREASED FUNCTIONAL MOBILITY AND ENDURANCE: ICD-10-CM

## 2025-02-17 PROCEDURE — 97530 THERAPEUTIC ACTIVITIES: CPT | Mod: PN

## 2025-02-17 PROCEDURE — 97112 NEUROMUSCULAR REEDUCATION: CPT | Mod: PN

## 2025-02-17 NOTE — PROGRESS NOTES
Outpatient Rehab    Physical Therapy Visit    Patient Name: Isamar Osorio  MRN: [id number]  YOB: 1965  Today's Date: 2/17/2025    Therapy Diagnosis:   Encounter Diagnoses   Name Primary?    S/P total knee arthroplasty, right Yes    Decreased functional mobility and endurance     Decreased ROM of right knee      Physician: Teo Richard,*    Physician Orders: Eval and Treat  Medical Diagnosis:   S/P total knee arthroplasty, right Yes   Decreased functional mobility and endurance    Decreased ROM of right knee        Physician Orders: Eval and Treat  Date of Evaluation:  1/24/2025   Insurance Authorization Period: 1/1/2025  to end of year   Plan of Care Certification:  1/24/2025 to 3/20/2025    Visit # / Visits Authorized:  11/no limit    PTA VISIT #                              Time In: 0917   Time Out: 1006  Total Time: 49   Total Billable Time: 49    FOTO:  Intake Score:  %  Survey Score 1:  %  Survey Score 2:  %         Subjective   knee feels like a band around it at times.  Pain reported as 0/10.      Objective       Knee Range of Motion   Right Knee   Active (deg) Passive (deg) Pain   Flexion 118 128     Extension -5 -3         Quad lag with -11           Treatment:       Manual Therapy  Manual Therapy Activity 1: prom to right knee x 8 min    Balance/Neuromuscular Re-Education  Balance/Neuromuscular Re-Education Activity 1: terminal knee extension with estim x 25 reps  Balance/Neuromuscular Re-Education Activity 2: quad sets with estim x 25 reps  Balance/Neuromuscular Re-Education Activity 3: straight leg raise x 15 reps using strap today  Balance/Neuromuscular Re-Education Activity 4: slant board x 2 min  Balance/Neuromuscular Re-Education Activity 5: heel slides using towel 10 x 5 s/h using strap    Therapeutic Activity  Therapeutic Activity 1: biking x 9 min seat level 5  Therapeutic Activity 2: double support leg press with 70lb x  25 reps to help with squatting and  bending  Therapeutic Activity 3: double support calf pressing with 70lb x 20 reps for toe raising to reach into cabinets  Therapeutic Activity 4: total gym leg pressing simulating getting up from kneeling level 10 x 20 reps  Therapeutic Activity 5: total gym calf pressing simulaitng toe raising to reach into cabinets x 10 reps with level 10  Therapeutic Activity 6: b hip abd, add 10 x 20# to promote pelvic stability with walking  Therapeutic Activity 7: swissball knee flextion with towel x 15 to promote functional flexion for walking  Therapeutic Activity 8: r ss calf presses 20 x 50# to simulate reaching on tip toes  Therapeutic Activity 9: treadmill 1.1mph x 6' to promote endurance with walking and functional mobility    Assessment & Plan   Assessment: pt had improved rom with flexion today       Patient will continue to benefit from skilled outpatient physical therapy to address the deficits listed in the problem list box on initial evaluation, provide pt/family education and to maximize pt's level of independence in the home and community environment.     Patient's spiritual, cultural, and educational needs considered and patient agreeable to plan of care and goals.                Goals:   Active       Ambulation/movement       Patient will walk independent without an assistive device  (Progressing)       Start:  01/24/25    Expected End:  02/21/25               Ambulation/movement          Pain       Patient will report pain of 1/10 demonstrating a reduction of overall pain (Progressing)       Start:  01/24/25    Expected End:  03/21/25               Range of Motion       Patient will achieve right knee ROM of  flexion -120 degrees  (Progressing)       Start:  01/24/25    Expected End:  02/21/25               Range of Motion       Patient will achieve right knee ROM of  ext -0  degrees with quad lag  (Progressing)       Start:  01/24/25    Expected End:  03/21/25               Strength       Patient will  achieve right knee flexion strength of 3+/5 (Progressing)       Start:  01/24/25    Expected End:  02/21/25            Patient will achieve right knee extension strength of 3+/5 (Progressing)       Start:  01/24/25    Expected End:  02/21/25               Strength       Patient will achieve right knee flexion strength of 5/5 (Progressing)       Start:  01/24/25    Expected End:  03/21/25            Patient will achieve right knee extension strength of 5/5 (Progressing)       Start:  01/24/25    Expected End:  03/21/25              Resolved       Pain       Patient will report pain of 4/10 demonstrating a reduction of overall pain (Met)       Start:  01/24/25    Expected End:  02/21/25    Resolved:  02/17/25             Plan:     Planned therapy interventions include: Therapeutic exercise and Therapeutic activities.    Planned modalities to include: Electrical stimulation - passive/unattended.         Visit Frequency: 3 times Per Week for 8 Weeks.       Akash Puentes, PT

## 2025-02-18 ENCOUNTER — CLINICAL SUPPORT (OUTPATIENT)
Dept: REHABILITATION | Facility: HOSPITAL | Age: 60
End: 2025-02-18
Payer: COMMERCIAL

## 2025-02-18 DIAGNOSIS — Z74.09 DECREASED FUNCTIONAL MOBILITY AND ENDURANCE: Primary | ICD-10-CM

## 2025-02-18 DIAGNOSIS — M25.661 DECREASED ROM OF RIGHT KNEE: ICD-10-CM

## 2025-02-18 DIAGNOSIS — Z96.651 S/P TOTAL KNEE ARTHROPLASTY, RIGHT: ICD-10-CM

## 2025-02-18 PROCEDURE — 97112 NEUROMUSCULAR REEDUCATION: CPT | Mod: PN

## 2025-02-18 PROCEDURE — 97530 THERAPEUTIC ACTIVITIES: CPT | Mod: PN

## 2025-02-18 NOTE — PROGRESS NOTES
Outpatient Rehab    Physical Therapy Visit    Patient Name: Isamar Osorio  MRN: [id number]  YOB: 1965  Today's Date: 2/18/2025    Therapy Diagnosis:   Encounter Diagnoses   Name Primary?    Decreased functional mobility and endurance Yes    S/P total knee arthroplasty, right     Decreased ROM of right knee      Physician: Teo Richard,*    Physician Orders: Eval and Treat     Medical Diagnosis:   S/P total knee arthroplasty, right Yes   Decreased functional mobility and endurance     Decreased ROM of right knee          Physician Orders: Eval and Treat  Date of Evaluation:  1/24/2025   Insurance Authorization Period: 1/1/2025  to end of year   Plan of Care Certification:  1/24/2025 to 3/20/2025    Visit # / Visits Authorized:  12/no limit    PTA VISIT #       Time In: 0830   Time Out: 0934  Total Time: 64   Total Billable Time: 55    FOTO:  Intake Score:  %  Survey Score 1:  %  Survey Score 2:  %         Subjective   feeling a little better today.  Family / care giver present for this visit:          Objective       Knee Range of Motion   Right Knee   Active (deg) Passive (deg) Pain   Flexion 120 130     Extension 0 0         Quad lag -10            Treatment:            Balance/Neuromuscular Re-Education  Balance/Neuromuscular Re-Education Activity 1: (P) terminal knee extension with estim x 25 reps  Balance/Neuromuscular Re-Education Activity 2: (P) quad sets with estim x 25 reps  Balance/Neuromuscular Re-Education Activity 3: (P) straight leg raise x 15 reps using strap today  Balance/Neuromuscular Re-Education Activity 4: (P) slant board x 2 min  Balance/Neuromuscular Re-Education Activity 5: (P) heel slides using towel 10 x 5 s/h using strap    Therapeutic Activity  Therapeutic Activity 1: (P) biking x 9 min seat level 5  Therapeutic Activity 2: (P) double support leg press with 70lb x  25 reps to help with squatting and bending  Therapeutic Activity 3: (P) double support calf pressing  with 70lb x 20 reps for toe raising to reach into cabinets  Therapeutic Activity 4: (P) total gym leg pressing simulating getting up from kneeling level 10 x 20 reps  Therapeutic Activity 5: (P) total gym calf pressing simulaitng toe raising to reach into cabinets x 10 reps with level 10  Therapeutic Activity 6: (P) b hip abd, add 10 x 20# to promote pelvic stability with walking  Therapeutic Activity 7: (P) swissball knee flextion with towel x 15 to promote functional flexion for walking  Therapeutic Activity 8: (P) r ss calf presses 20 x 50# to simulate reaching on tip toes  Therapeutic Activity 9: (P) treadmill 1.1mph x 6' to promote endurance with walking and functional mobility                   Assessment & Plan   Assessment: pt fatigued with quad exercises .   ambulating with less limp       Patient will continue to benefit from skilled outpatient physical therapy to address the deficits listed in the problem list box on initial evaluation, provide pt/family education and to maximize pt's level of independence in the home and community environment.     Patient's spiritual, cultural, and educational needs considered and patient agreeable to plan of care and goals.           Plan: Continue with current POC.    Goals:   Active       Ambulation/movement       Patient will walk independent without an assistive device  (Progressing)       Start:  01/24/25    Expected End:  02/21/25               Ambulation/movement          Pain       Patient will report pain of 1/10 demonstrating a reduction of overall pain (Progressing)       Start:  01/24/25    Expected End:  03/21/25               Range of Motion       Patient will achieve right knee ROM of  flexion -120 degrees  (Progressing)       Start:  01/24/25    Expected End:  02/21/25               Range of Motion       Patient will achieve right knee ROM of  ext -0  degrees with quad lag  (Progressing)       Start:  01/24/25    Expected End:  03/21/25                Strength       Patient will achieve right knee flexion strength of 3+/5 (Progressing)       Start:  01/24/25    Expected End:  02/21/25            Patient will achieve right knee extension strength of 3+/5 (Progressing)       Start:  01/24/25    Expected End:  02/21/25               Strength       Patient will achieve right knee flexion strength of 5/5 (Progressing)       Start:  01/24/25    Expected End:  03/21/25            Patient will achieve right knee extension strength of 5/5 (Progressing)       Start:  01/24/25    Expected End:  03/21/25              Resolved       Pain       Patient will report pain of 4/10 demonstrating a reduction of overall pain (Met)       Start:  01/24/25    Expected End:  02/21/25    Resolved:  02/17/25           Expand All Collapse All     Outpatient Rehab     Physical Therapy Visit     Patient Name: Isamar Osorio  MRN: [id number]  YOB: 1965  Today's Date: 2/17/2025     Therapy Diagnosis:        Encounter Diagnoses   Name Primary?    S/P total knee arthroplasty, right Yes    Decreased functional mobility and endurance      Decreased ROM of right knee        Physician: Teo Richard,*     Physician Orders: Eval and Treat  Medical Diagnosis:   S/P total knee arthroplasty, right Yes   Decreased functional mobility and endurance     Decreased ROM of right knee          Physician Orders: Eval and Treat  Date of Evaluation:  1/24/2025   Insurance Authorization Period: 1/1/2025  to end of year   Plan of Care Certification:  1/24/2025 to 3/20/2025    Visit # / Visits Authorized:  11/no limit    PTA VISIT #                              Time In: 0917   Time Out: 1006  Total Time: 49   Total Billable Time: 49     FOTO:  Intake Score:  %  Survey Score 1:  %  Survey Score 2:  %        Subjective  knee feels like a band around it at times.  Pain reported as 0/10.       Objective   Knee Range of Motion   Right Knee    Active (deg) Passive (deg) Pain   Flexion 118 128      Extension -5 -3           Quad lag with -11              Treatment:     Manual Therapy  Manual Therapy Activity 1: prom to right knee x 8 min     Balance/Neuromuscular Re-Education  Balance/Neuromuscular Re-Education Activity 1: terminal knee extension with estim x 25 reps  Balance/Neuromuscular Re-Education Activity 2: quad sets with estim x 25 reps  Balance/Neuromuscular Re-Education Activity 3: straight leg raise x 15 reps using strap today  Balance/Neuromuscular Re-Education Activity 4: slant board x 2 min  Balance/Neuromuscular Re-Education Activity 5: heel slides using towel 10 x 5 s/h using strap     Therapeutic Activity  Therapeutic Activity 1: biking x 9 min seat level 5  Therapeutic Activity 2: double support leg press with 70lb x  25 reps to help with squatting and bending  Therapeutic Activity 3: double support calf pressing with 70lb x 20 reps for toe raising to reach into cabinets  Therapeutic Activity 4: total gym leg pressing simulating getting up from kneeling level 10 x 20 reps  Therapeutic Activity 5: total gym calf pressing simulaitng toe raising to reach into cabinets x 10 reps with level 10  Therapeutic Activity 6: b hip abd, add 10 x 20# to promote pelvic stability with walking  Therapeutic Activity 7: swissball knee flextion with towel x 15 to promote functional flexion for walking  Therapeutic Activity 8: r ss calf presses 20 x 50# to simulate reaching on tip toes  Therapeutic Activity 9: treadmill 1.1mph x 6' to promote endurance with walking and functional mobility     Assessment & Plan  Assessment: pt had improved rom with flexion today     Patient will continue to benefit from skilled outpatient physical therapy to address the deficits listed in the problem list box on initial evaluation, provide pt/family education and to maximize pt's level of independence in the home and community environment.      Patient's spiritual, cultural, and educational needs considered and patient agreeable  to plan of care and goals.                   Goals:   Active         Ambulation/movement         Patient will walk independent without an assistive device  (Progressing)         Start:  01/24/25    Expected End:  02/21/25                 Ambulation/movement            Pain         Patient will report pain of 1/10 demonstrating a reduction of overall pain (Progressing)         Start:  01/24/25    Expected End:  03/21/25                 Range of Motion         Patient will achieve right knee ROM of  flexion -120 degrees  (Progressing)         Start:  01/24/25    Expected End:  02/21/25                 Range of Motion         Patient will achieve right knee ROM of  ext -0  degrees with quad lag  (Progressing)         Start:  01/24/25    Expected End:  03/21/25                 Strength         Patient will achieve right knee flexion strength of 3+/5 (Progressing)         Start:  01/24/25    Expected End:  02/21/25              Patient will achieve right knee extension strength of 3+/5 (Progressing)         Start:  01/24/25    Expected End:  02/21/25                 Strength         Patient will achieve right knee flexion strength of 5/5 (Progressing)         Start:  01/24/25    Expected End:  03/21/25              Patient will achieve right knee extension strength of 5/5 (Progressing)         Start:  01/24/25    Expected End:  03/21/25               Resolved         Pain         Patient will report pain of 4/10 demonstrating a reduction of overall pain (Met)         Start:  01/24/25    Expected End:  02/21/25    Resolved:  02/17/25              Plan:     Planned therapy interventions include: Therapeutic exercise and Therapeutic activities.    Planned modalities to include: Electrical stimulation - passive/unattended.         Visit Frequency: 3 times Per Week for 8 Weeks.             Akash Puentes, PT

## 2025-02-21 ENCOUNTER — CLINICAL SUPPORT (OUTPATIENT)
Dept: REHABILITATION | Facility: HOSPITAL | Age: 60
End: 2025-02-21
Payer: COMMERCIAL

## 2025-02-21 DIAGNOSIS — Z96.651 S/P TOTAL KNEE ARTHROPLASTY, RIGHT: ICD-10-CM

## 2025-02-21 DIAGNOSIS — Z74.09 DECREASED FUNCTIONAL MOBILITY AND ENDURANCE: Primary | ICD-10-CM

## 2025-02-21 DIAGNOSIS — M25.661 DECREASED ROM OF RIGHT KNEE: ICD-10-CM

## 2025-02-21 PROCEDURE — 97530 THERAPEUTIC ACTIVITIES: CPT | Mod: PN,CQ

## 2025-02-21 PROCEDURE — 97112 NEUROMUSCULAR REEDUCATION: CPT | Mod: PN,CQ

## 2025-02-21 NOTE — PROGRESS NOTES
Outpatient Rehab    Physical Therapy Visit    Patient Name: Isamar Osorio  MRN: [id number]  YOB: 1965  Today's Date: 2/21/2025    Therapy Diagnosis:   Encounter Diagnoses   Name Primary?    Decreased functional mobility and endurance Yes    S/P total knee arthroplasty, right     Decreased ROM of right knee      Physician: Teo Richard,*    Physician Orders: Eval and Treat  Medical Diagnosis: right total knee   Date of Evaluation:  1/24/2025   Insurance Authorization Period: 1/1/2025  to end of year   Plan of Care Certification:  1/24/2025 to 3/20/2025    Visit # / Visits Authorized:  13/no limit    PTA VISIT #  1    Time In: 920    Time Out: 1010  Total Time:     Total Billable Time: 50    Subjective   I'm really stiff this am, but I slept with it bent for several hours.  Family / care giver present for this visit:     stiffness pain    Objective       Knee Range of Motion   Right Knee   Active (deg) Passive (deg) Pain   Flexion 121       Extension -2           Quad lag -9           Treatment:  Balance/Neuromuscular Re-Education  Balance/Neuromuscular Re-Education Activity 1: terminal knee extension with estim x 25 reps  Balance/Neuromuscular Re-Education Activity 2: quad sets with estim x 25 reps  Balance/Neuromuscular Re-Education Activity 4: slant board x 2 min  Balance/Neuromuscular Re-Education Activity 5: sitting left hs stretches x 8    Therapeutic Activity  Therapeutic Activity 1: biking x 9 min seat level 3  Therapeutic Activity 3: double support calf pressing with 70lb x 20 reps for toe raising to reach into cabinets  Therapeutic Activity 4: total gym leg pressing simulating getting up from kneeling level 10 x 20 reps  Therapeutic Activity 5: total gym calf pressing simulaitng toe raising to reach into cabinets x 10 reps with level 10  Therapeutic Activity 6: b hip abd, add 10 x 20# to promote pelvic stability with walking  Therapeutic Activity 7: swissball knee flextion with towel  x 15 to promote functional flexion for walking  Therapeutic Activity 8: r ss leg presses 20 x 55# to simulate reaching on tip toes    Assessment & Plan   Assessment: Pat able to ride bike on 2 levels lower this session  Evaluation/Treatment Tolerance: Patient tolerated treatment well    Patient will continue to benefit from skilled outpatient physical therapy to address the deficits listed in the problem list box on initial evaluation, provide pt/family education and to maximize pt's level of independence in the home and community environment.     Patient's spiritual, cultural, and educational needs considered and patient agreeable to plan of care and goals.           Plan:    Planned therapy interventions include: Therapeutic exercise and Therapeutic activities.    Planned modalities to include: Electrical stimulation - passive/unattended.         Visit Frequency: 3 times Per Week for 8 Weeks.       Goals:   Active       Ambulation/movement       Patient will walk independent without an assistive device  (Progressing)       Start:  01/24/25    Expected End:  02/21/25               Ambulation/movement          Pain       Patient will report pain of 1/10 demonstrating a reduction of overall pain (Progressing)       Start:  01/24/25    Expected End:  03/21/25               Range of Motion       Patient will achieve right knee ROM of  flexion -120 degrees  (Progressing)       Start:  01/24/25    Expected End:  02/21/25               Range of Motion       Patient will achieve right knee ROM of  ext -0  degrees with quad lag  (Progressing)       Start:  01/24/25    Expected End:  03/21/25               Strength       Patient will achieve right knee flexion strength of 3+/5 (Progressing)       Start:  01/24/25    Expected End:  02/21/25            Patient will achieve right knee extension strength of 3+/5 (Progressing)       Start:  01/24/25    Expected End:  02/21/25               Strength       Patient will achieve  right knee flexion strength of 5/5 (Progressing)       Start:  01/24/25    Expected End:  03/21/25            Patient will achieve right knee extension strength of 5/5 (Progressing)       Start:  01/24/25    Expected End:  03/21/25              Resolved       Pain       Patient will report pain of 4/10 demonstrating a reduction of overall pain (Met)       Start:  01/24/25    Expected End:  02/21/25    Resolved:  02/17/25             Rajni Bond, PTA

## 2025-02-24 ENCOUNTER — CLINICAL SUPPORT (OUTPATIENT)
Dept: REHABILITATION | Facility: HOSPITAL | Age: 60
End: 2025-02-24
Payer: COMMERCIAL

## 2025-02-24 DIAGNOSIS — M25.661 DECREASED ROM OF RIGHT KNEE: ICD-10-CM

## 2025-02-24 DIAGNOSIS — Z74.09 DECREASED FUNCTIONAL MOBILITY AND ENDURANCE: Primary | ICD-10-CM

## 2025-02-24 DIAGNOSIS — Z96.651 S/P TOTAL KNEE ARTHROPLASTY, RIGHT: ICD-10-CM

## 2025-02-24 PROCEDURE — 97112 NEUROMUSCULAR REEDUCATION: CPT | Mod: PN,CQ

## 2025-02-24 PROCEDURE — 97530 THERAPEUTIC ACTIVITIES: CPT | Mod: PN,CQ

## 2025-02-24 NOTE — PROGRESS NOTES
Outpatient Rehab    Physical Therapy Visit    Patient Name: Isamar Osorio  MRN: [id number]  YOB: 1965  Encounter Date: 2/24/2025    Therapy Diagnosis:   Encounter Diagnoses   Name Primary?    Decreased functional mobility and endurance Yes    S/P total knee arthroplasty, right     Decreased ROM of right knee      Physician: Teo Richard,*  Physician Orders: Eval and Treat  Date of Evaluation:  1/24/2025   Insurance Authorization Period: 1/1/2025  to end of year   Plan of Care Certification:  1/24/2025 to 3/20/2025    Visit # / Visits Authorized:  14/no limit    PTA VISIT #  2    Time In:  922  Time Out:  1017  Total Time:     Total Billable Time: 55       Subjective   I went shopping the other day for about an hour and half, was tired but did ok. I'm still having pain along the back and outside of knee..         Objective       Knee Range of Motion   Right Knee   Active (deg) Passive (deg) Pain   Flexion 120       Extension 0           Quad lag -7           Treatment:  Balance/Neuromuscular Re-Education  Balance/Neuromuscular Re-Education Activity 1: terminal knee extension with estim 15 x 5#  Balance/Neuromuscular Re-Education Activity 2: quad sets with estim x 25 reps  Balance/Neuromuscular Re-Education Activity 4: slant board x 2 min  Balance/Neuromuscular Re-Education Activity 5: lright hs stretch on step x 5  Balance/Neuromuscular Re-Education Activity 6: ITB stretch x 5    Therapeutic Activity  Therapeutic Activity 1: biking x 9 min seat level 3  Therapeutic Activity 4: total gym leg pressing simulating getting up from kneeling level 10 x 20 reps  Therapeutic Activity 5: total gym calf pressing simulaitng toe raising to reach into cabinets x 20 reps with level 10  Therapeutic Activity 6: b hip abd, add 10 x 20# to promote pelvic stability with walking  Therapeutic Activity 8: r ss leg presses 20 x 55# to simulate bending and squatting    Assessment & Plan   Assessment: Pat instructed to  increase time on treadmill at home, work up to a mile without resting.       Patient will continue to benefit from skilled outpatient physical therapy to address the deficits listed in the problem list box on initial evaluation, provide pt/family education and to maximize pt's level of independence in the home and community environment.     Patient's spiritual, cultural, and educational needs considered and patient agreeable to plan of care and goals.     Plan:  Planned therapy interventions include: Therapeutic exercise and Therapeutic activities.    Planned modalities to include: Electrical stimulation - passive/unattended.         Visit Frequency: 3 times Per Week for 8 Weeks.    Goals:   Active       Ambulation/movement       Patient will walk independent without an assistive device  (Progressing)       Start:  01/24/25    Expected End:  02/21/25               Ambulation/movement          Range of Motion       Patient will achieve right knee ROM of  ext -0  degrees with quad lag  (Progressing)       Start:  01/24/25    Expected End:  03/21/25               Strength       Patient will achieve right knee flexion strength of 3+/5 (Met)       Start:  01/24/25    Expected End:  02/21/25    Resolved:  02/24/25         Patient will achieve right knee extension strength of 3+/5 (Met)       Start:  01/24/25    Expected End:  02/21/25    Resolved:  02/24/25            Strength       Patient will achieve right knee flexion strength of 5/5 (Progressing)       Start:  01/24/25    Expected End:  03/21/25            Patient will achieve right knee extension strength of 5/5 (Progressing)       Start:  01/24/25    Expected End:  03/21/25              Resolved       Pain       Patient will report pain of 4/10 demonstrating a reduction of overall pain (Met)       Start:  01/24/25    Expected End:  02/21/25    Resolved:  02/17/25            Pain       Patient will report pain of 1/10 demonstrating a reduction of overall pain (Met)        Start:  01/24/25    Expected End:  03/21/25    Resolved:  02/24/25            Range of Motion       Patient will achieve right knee ROM of  flexion -120 degrees  (Met)       Start:  01/24/25    Expected End:  02/21/25    Resolved:  02/24/25             Rajni Bond, PTA

## 2025-02-26 ENCOUNTER — CLINICAL SUPPORT (OUTPATIENT)
Dept: REHABILITATION | Facility: HOSPITAL | Age: 60
End: 2025-02-26
Payer: COMMERCIAL

## 2025-02-26 DIAGNOSIS — Z96.651 S/P TOTAL KNEE ARTHROPLASTY, RIGHT: Primary | ICD-10-CM

## 2025-02-26 PROCEDURE — 97530 THERAPEUTIC ACTIVITIES: CPT | Mod: PN,CQ

## 2025-02-26 PROCEDURE — 97112 NEUROMUSCULAR REEDUCATION: CPT | Mod: PN,CQ

## 2025-02-26 NOTE — PROGRESS NOTES
Outpatient Rehab    Physical Therapy Visit    Patient Name: Isamar Osorio  MRN: [id number]  YOB: 1965  Encounter Date: 2/26/2025    Therapy Diagnosis: No diagnosis found.  Physician: Teo Richard,*    Physician Orders: Eval and Treat  Physician Orders: Eval and Treat  Date of Evaluation:  1/24/2025   Insurance Authorization Period: 1/1/2025  to end of year   Plan of Care Certification:  1/24/2025 to 3/20/2025    Visit # / Visits Authorized:  15/no limit    PTA VISIT #  3    Time In:   925  Time Out:  1018  Total Time:     Total Billable Time: 53    FOTO:  Intake Score:  %  Survey Score 1:  %  Survey Score 2: 61 % visit 15         Subjective   I did a lot of stuff yesterday, and had to take 2 extra strength tylenol last night. I can tell my balance isn't real good..         Objective       Knee Range of Motion   Right Knee   Active (deg) Passive (deg) Pain   Flexion 124       Extension 0           Quad lag -7           Treatment:  Balance/Neuromuscular Re-Education  Balance/Neuromuscular Re-Education Activity 1: terminal knee extension with estim 15 x 5#  Balance/Neuromuscular Re-Education Activity 2: quad sets with estim x 25 reps  Balance/Neuromuscular Re-Education Activity 4: slant board x 2 min  Balance/Neuromuscular Re-Education Activity 7: fred ss balance without ue support x 5  Balance/Neuromuscular Re-Education Activity 8: heel-toe walking and retro 2 x 25'    Therapeutic Activity  Therapeutic Activity 1: biking x 9 min seat level 2  Therapeutic Activity 3: double support calf pressing with 70lb x 20 reps for toe raising to reach into cabinets  Therapeutic Activity 4: total gym leg pressing simulating getting up from kneeling level 10 x 20 reps  Therapeutic Activity 5: total gym calf pressing simulaitng toe raising to reach into cabinets x 20 reps with level 10  Therapeutic Activity 6: b hip abd, add 10 x 20# to promote pelvic stability with walking  Therapeutic Activity 8: r ss leg  presses 20 x 55# to simulate bending and squatting  Therapeutic Activity 10: chair squats x 10 without ue support to simulate getting up from low chair    Assessment & Plan   Assessment: Pat instructed to include ss balancing, heel-toe walking and chair squats to hep. pat able to ride bike at lower level this session  Evaluation/Treatment Tolerance: Patient tolerated treatment well    Patient will continue to benefit from skilled outpatient physical therapy to address the deficits listed in the problem list box on initial evaluation, provide pt/family education and to maximize pt's level of independence in the home and community environment.     Patient's spiritual, cultural, and educational needs considered and patient agreeable to plan of care and goals.           Plan:  Planned therapy interventions include: Therapeutic exercise and Therapeutic activities.    Planned modalities to include: Electrical stimulation - passive/unattended.      Goals:   Active       Ambulation/movement          Range of Motion       Patient will achieve right knee ROM of  ext -0  degrees with quad lag  (Progressing)       Start:  01/24/25    Expected End:  03/21/25               Strength       Patient will achieve right knee flexion strength of 3+/5 (Met)       Start:  01/24/25    Expected End:  02/21/25    Resolved:  02/24/25         Patient will achieve right knee extension strength of 3+/5 (Met)       Start:  01/24/25    Expected End:  02/21/25    Resolved:  02/24/25            Strength       Patient will achieve right knee flexion strength of 5/5 (Progressing)       Start:  01/24/25    Expected End:  03/21/25            Patient will achieve right knee extension strength of 5/5 (Progressing)       Start:  01/24/25    Expected End:  03/21/25              Resolved       Ambulation/movement       Patient will walk independent without an assistive device  (Met)       Start:  01/24/25    Expected End:  02/21/25    Resolved:  02/26/25             Pain       Patient will report pain of 4/10 demonstrating a reduction of overall pain (Met)       Start:  01/24/25    Expected End:  02/21/25    Resolved:  02/17/25            Pain       Patient will report pain of 1/10 demonstrating a reduction of overall pain (Met)       Start:  01/24/25    Expected End:  03/21/25    Resolved:  02/24/25            Range of Motion       Patient will achieve right knee ROM of  flexion -120 degrees  (Met)       Start:  01/24/25    Expected End:  02/21/25    Resolved:  02/24/25             Rajni Bond, PTA

## 2025-02-28 ENCOUNTER — CLINICAL SUPPORT (OUTPATIENT)
Dept: REHABILITATION | Facility: HOSPITAL | Age: 60
End: 2025-02-28
Payer: COMMERCIAL

## 2025-02-28 DIAGNOSIS — M25.661 DECREASED ROM OF RIGHT KNEE: ICD-10-CM

## 2025-02-28 DIAGNOSIS — Z74.09 DECREASED FUNCTIONAL MOBILITY AND ENDURANCE: ICD-10-CM

## 2025-02-28 DIAGNOSIS — Z96.651 S/P TOTAL KNEE ARTHROPLASTY, RIGHT: Primary | ICD-10-CM

## 2025-02-28 PROCEDURE — 97530 THERAPEUTIC ACTIVITIES: CPT | Mod: PN,CQ

## 2025-02-28 PROCEDURE — 97112 NEUROMUSCULAR REEDUCATION: CPT | Mod: PN,CQ

## 2025-03-03 ENCOUNTER — CLINICAL SUPPORT (OUTPATIENT)
Dept: REHABILITATION | Facility: HOSPITAL | Age: 60
End: 2025-03-03
Payer: COMMERCIAL

## 2025-03-03 DIAGNOSIS — Z96.651 S/P TOTAL KNEE ARTHROPLASTY, RIGHT: Primary | ICD-10-CM

## 2025-03-03 DIAGNOSIS — M25.661 DECREASED ROM OF RIGHT KNEE: ICD-10-CM

## 2025-03-03 DIAGNOSIS — Z74.09 DECREASED FUNCTIONAL MOBILITY AND ENDURANCE: ICD-10-CM

## 2025-03-03 PROCEDURE — 97112 NEUROMUSCULAR REEDUCATION: CPT | Mod: PN

## 2025-03-03 PROCEDURE — 97530 THERAPEUTIC ACTIVITIES: CPT | Mod: PN

## 2025-03-03 NOTE — PROGRESS NOTES
Outpatient Rehab    Physical Therapy Visit    Patient Name: Isamar Osorio  MRN: [id number]  YOB: 1965  Encounter Date: 3/3/2025    Therapy Diagnosis:   Encounter Diagnoses   Name Primary?    S/P total knee arthroplasty, right Yes    Decreased functional mobility and endurance     Decreased ROM of right knee      Physician: Teo Richard,*    Physician Orders: Eval and Treat        Expand All Collapse All       Outpatient Rehab     Physical Therapy Visit     Patient Name: Isamar Osorio  MRN: [id number]  YOB: 1965  Encounter Date: 2/28/2025     Therapy Diagnosis:        Encounter Diagnoses   Name Primary?    S/P total knee arthroplasty, right Yes    Decreased functional mobility and endurance      Decreased ROM of right knee        Physician: Teo Richard,*     Physician Orders: Eval and Treat  Date of Evaluation:  1/24/2025   Insurance Authorization Period: 1/1/2025  to end of year   Plan of Care Certification:  1/24/2025 to 3/20/2025    Visit # / Visits Authorized:  16/no limit    PTA VISIT #  4           Time In: 0919   Time Out: 1010  Total Time: 51   Total Billable Time: 51    FOTO:  Intake Score:  %  Survey Score 1:  %  Survey Score 2:  %         Subjective   it band is sore and tight ..  Pain reported as 1/10.      Objective       Knee Range of Motion   Right Knee   Active (deg) Passive (deg) Pain   Flexion 130 130     Extension -2           Quad lag -3           Treatment:  Manual Therapy  Manual Therapy Activity 1: prom to right knee x 8 min    Balance/Neuromuscular Re-Education  Balance/Neuromuscular Re-Education Activity 1: terminal knee extension with estim 15 x 5#  Balance/Neuromuscular Re-Education Activity 2: quad sets with estim x 25 reps  Balance/Neuromuscular Re-Education Activity 3: straight leg raise x 15 reps using strap today  Balance/Neuromuscular Re-Education Activity 4: slant board x 2 min  Balance/Neuromuscular Re-Education Activity 5: lright hs  "stretch on step x 5  Balance/Neuromuscular Re-Education Activity 6: lateral step downs 4" step x 10  Balance/Neuromuscular Re-Education Activity 7: fred ss balance without ue support x 5  Balance/Neuromuscular Re-Education Activity 8: heel-toe walking and retro 2 x 25'    Therapeutic Activity  Therapeutic Activity 1: biking x 9 min seat level 2  Therapeutic Activity 2: double support leg press with 70lb x  25 reps to help with squatting and bending  Therapeutic Activity 3: double support calf pressing with 70lb x 20 reps for toe raising to reach into cabinets  Therapeutic Activity 4: total gym leg pressing simulating getting up from kneeling level 10 x 20 reps  Therapeutic Activity 5: total gym calf pressing simulaitng toe raising to reach into cabinets x 20 reps with level 10  Therapeutic Activity 6: b hip abd, add 10 x 20# to promote pelvic stability with walking  Therapeutic Activity 7: swissball knee flextion with towel x 15 to promote functional flexion for walking  Therapeutic Activity 8: r ss leg presses 20 x 55# to simulate bending and squatting  Therapeutic Activity 9: treadmill 1.1mph x 6' to promote endurance with walking and functional mobility  Therapeutic Activity 10: chair squats x 10 without ue support to simulate getting up from low chair    Assessment & Plan   Assessment:         Patient will continue to benefit from skilled outpatient physical therapy to address the deficits listed in the problem list box on initial evaluation, provide pt/family education and to maximize pt's level of independence in the home and community environment.     Patient's spiritual, cultural, and educational needs considered and patient agreeable to plan of care and goals.         Goals:   Active       Ambulation/movement          Range of Motion       Patient will achieve right knee ROM of  ext -0  degrees with quad lag  (Progressing)       Start:  01/24/25    Expected End:  03/21/25               Strength       Patient " will achieve right knee flexion strength of 3+/5 (Met)       Start:  01/24/25    Expected End:  02/21/25    Resolved:  02/24/25         Patient will achieve right knee extension strength of 3+/5 (Met)       Start:  01/24/25    Expected End:  02/21/25    Resolved:  02/24/25            Strength       Patient will achieve right knee flexion strength of 5/5 (Progressing)       Start:  01/24/25    Expected End:  03/21/25            Patient will achieve right knee extension strength of 5/5 (Progressing)       Start:  01/24/25    Expected End:  03/21/25              Resolved       Ambulation/movement       Patient will walk independent without an assistive device  (Met)       Start:  01/24/25    Expected End:  02/21/25    Resolved:  02/26/25            Pain       Patient will report pain of 4/10 demonstrating a reduction of overall pain (Met)       Start:  01/24/25    Expected End:  02/21/25    Resolved:  02/17/25            Pain       Patient will report pain of 1/10 demonstrating a reduction of overall pain (Met)       Start:  01/24/25    Expected End:  03/21/25    Resolved:  02/24/25            Range of Motion       Patient will achieve right knee ROM of  flexion -120 degrees  (Met)       Start:  01/24/25    Expected End:  02/21/25    Resolved:  02/24/25             Plan:     Planned therapy interventions include: Therapeutic exercise and Therapeutic activities.    Planned modalities to include: Electrical stimulation - passive/unattended.         Visit Frequency: 3 times Per Week for 8 Weeks.              Akash Puentes, PT

## 2025-03-05 ENCOUNTER — CLINICAL SUPPORT (OUTPATIENT)
Dept: REHABILITATION | Facility: HOSPITAL | Age: 60
End: 2025-03-05
Payer: COMMERCIAL

## 2025-03-05 DIAGNOSIS — M25.661 DECREASED ROM OF RIGHT KNEE: ICD-10-CM

## 2025-03-05 DIAGNOSIS — Z96.651 S/P TOTAL KNEE ARTHROPLASTY, RIGHT: Primary | ICD-10-CM

## 2025-03-05 DIAGNOSIS — Z74.09 DECREASED FUNCTIONAL MOBILITY AND ENDURANCE: ICD-10-CM

## 2025-03-05 PROCEDURE — 97112 NEUROMUSCULAR REEDUCATION: CPT | Mod: PN,CQ

## 2025-03-05 PROCEDURE — 97530 THERAPEUTIC ACTIVITIES: CPT | Mod: PN,CQ

## 2025-03-05 NOTE — PROGRESS NOTES
Outpatient Rehab    Physical Therapy Visit    Patient Name: Isamar Osorio  MRN: [id number]  YOB: 1965  Encounter Date: 3/5/2025    Therapy Diagnosis:   Encounter Diagnoses   Name Primary?    S/P total knee arthroplasty, right Yes    Decreased functional mobility and endurance     Decreased ROM of right knee      Physician: Teo Richard,*    Physician Orders: Eval and Treat  Physician Orders: Eval and Treat  Date of Evaluation:  1/24/2025   Insurance Authorization Period: 1/1/2025  to end of year   Plan of Care Certification:  1/24/2025 to 3/20/2025    Visit # / Visits Authorized:  18/no limit    PTA VISIT #  1     Time In:   930  Time Out:  1015  Total Time:     Total Billable Time: 45    FOTO:  Intake Score:  %  Survey Score 1:  %  Survey Score 2:  %         Subjective   I think starting on the meloxicam has helped. I still have some pain under kneecap after I've been up on it a while..  Pain reported as 0/10.      Objective       Knee Range of Motion   Right Knee   Active (deg) Passive (deg) Pain   Flexion 130       Extension 2           Quad lag -3           Treatment:  Balance/Neuromuscular Re-Education  Balance/Neuromuscular Re-Education Activity 1: terminal knee extension with estim 15 x 5#  Balance/Neuromuscular Re-Education Activity 2: quad sets with estim x 25 reps  Balance/Neuromuscular Re-Education Activity 3: straight leg raise x 15 reps using strap today  Balance/Neuromuscular Re-Education Activity 4: slant board x 2 min    Therapeutic Activity  Therapeutic Activity 1: biking x 9 min seat level 0  Therapeutic Activity 4: total gym leg pressing simulating getting up from kneeling level 10 x 20 reps  Therapeutic Activity 5: total gym calf pressing simulaitng toe raising to reach into cabinets x 20 reps with level 10  Therapeutic Activity 6: b hip abd, add 10 x 20# to promote pelvic stability with walking  Therapeutic Activity 8: r ss leg presses 20 x 55# to simulate bending and  squatting  Therapeutic Activity 10: chair squats x 10 without ue support to simulate getting up from low chair    Assessment & Plan   Assessment: pat progressing with gaarom, able to perform chair squats with improved control and less ue support  Evaluation/Treatment Tolerance: Patient tolerated treatment well    Patient will continue to benefit from skilled outpatient physical therapy to address the deficits listed in the problem list box on initial evaluation, provide pt/family education and to maximize pt's level of independence in the home and community environment.     Patient's spiritual, cultural, and educational needs considered and patient agreeable to plan of care and goals.           Plan:  Planned therapy interventions include: Therapeutic exercise and Therapeutic activities.    Planned modalities to include: Electrical stimulation - passive/unattended.         Visit Frequency: 3 times Per Week for 8 Weeks.    Goals:   Active       Ambulation/movement          Range of Motion       Patient will achieve right knee ROM of  ext -0  degrees with quad lag  (Progressing)       Start:  01/24/25    Expected End:  03/21/25               Strength       Patient will achieve right knee flexion strength of 3+/5 (Met)       Start:  01/24/25    Expected End:  02/21/25    Resolved:  02/24/25         Patient will achieve right knee extension strength of 3+/5 (Met)       Start:  01/24/25    Expected End:  02/21/25    Resolved:  02/24/25            Strength       Patient will achieve right knee flexion strength of 5/5 (Progressing)       Start:  01/24/25    Expected End:  03/21/25            Patient will achieve right knee extension strength of 5/5 (Progressing)       Start:  01/24/25    Expected End:  03/21/25              Resolved       Ambulation/movement       Patient will walk independent without an assistive device  (Met)       Start:  01/24/25    Expected End:  02/21/25    Resolved:  02/26/25            Pain        Patient will report pain of 4/10 demonstrating a reduction of overall pain (Met)       Start:  01/24/25    Expected End:  02/21/25    Resolved:  02/17/25            Pain       Patient will report pain of 1/10 demonstrating a reduction of overall pain (Met)       Start:  01/24/25    Expected End:  03/21/25    Resolved:  02/24/25            Range of Motion       Patient will achieve right knee ROM of  flexion -120 degrees  (Met)       Start:  01/24/25    Expected End:  02/21/25    Resolved:  02/24/25             Rajni Bond, PTA

## 2025-03-07 ENCOUNTER — CLINICAL SUPPORT (OUTPATIENT)
Dept: REHABILITATION | Facility: HOSPITAL | Age: 60
End: 2025-03-07
Payer: COMMERCIAL

## 2025-03-07 DIAGNOSIS — Z74.09 DECREASED FUNCTIONAL MOBILITY AND ENDURANCE: Primary | ICD-10-CM

## 2025-03-07 DIAGNOSIS — M25.661 DECREASED ROM OF RIGHT KNEE: ICD-10-CM

## 2025-03-07 DIAGNOSIS — Z96.651 S/P TOTAL KNEE ARTHROPLASTY, RIGHT: ICD-10-CM

## 2025-03-07 PROCEDURE — 97530 THERAPEUTIC ACTIVITIES: CPT | Mod: PN

## 2025-03-07 PROCEDURE — 97112 NEUROMUSCULAR REEDUCATION: CPT | Mod: PN

## 2025-03-07 NOTE — PROGRESS NOTES
Outpatient Rehab    Physical Therapy Visit    Patient Name: Isamar Osorio  MRN: [id number]  YOB: 1965  Encounter Date: 3/7/2025    Therapy Diagnosis:   Encounter Diagnoses   Name Primary?    Decreased functional mobility and endurance Yes    Decreased ROM of right knee     S/P total knee arthroplasty, right      Physician: Teo Richard,*    Physician Orders: Eval and Treat  Physician Orders: Eval and Treat  Date of Evaluation:  1/24/2025   Insurance Authorization Period: 1/1/2025  to end of year   Plan of Care Certification:  1/24/2025 to 3/20/2025    Visit # / Visits Authorized:  19/no limit    PTA VISIT #  1     Time In: 0924   Time Out: 1015  Total Time: 51   Total Billable Time: 51    FOTO:  Intake Score:  %  Survey Score 1:  %  Survey Score 2:  %         Subjective   pt voices she is feeling better still feels tight like a band.  Pain reported as 0/10.      Objective            Treatment:            Balance/Neuromuscular Re-Education  Balance/Neuromuscular Re-Education Activity 1: terminal knee extension with estim 15 x 5#  Balance/Neuromuscular Re-Education Activity 2: quad sets with estim x 25 reps  Balance/Neuromuscular Re-Education Activity 3: straight leg raise x 15 reps using strap today  Balance/Neuromuscular Re-Education Activity 4: slant board x 2 min  Balance/Neuromuscular Re-Education Activity 5: lright hs stretch on step x 5  Balance/Neuromuscular Re-Education Activity 6: golfer  8 lb x 10 reps  Balance/Neuromuscular Re-Education Activity 7: fred ss balance without ue support x 5  Balance/Neuromuscular Re-Education Activity 8: heel-toe walking and retro 2 x 25'    Therapeutic Activity  Therapeutic Activity 1: biking x 9 min seat level 0  Therapeutic Activity 2: double support leg press with 70lb x  25 reps to help with squatting and bending  Therapeutic Activity 3: double support calf pressing with 70lb x 20 reps for toe raising to reach into cabinets  Therapeutic  Activity 4: total gym leg pressing simulating getting up from kneeling level 10 x 20 reps  Therapeutic Activity 5: total gym calf pressing simulaitng toe raising to reach into cabinets x 20 reps with level 10  Therapeutic Activity 6: b hip abd, add 10 x 20# to promote pelvic stability with walking  Therapeutic Activity 7: swissball knee flextion with towel x 15 to promote functional flexion for walking  Therapeutic Activity 8: r ss leg presses 20 x 55# to simulate bending and squatting  Therapeutic Activity 9: treadmill 1.1mph x 6' to promote endurance with walking and functional mobility  Therapeutic Activity 10: plyometric ball toss 3 ways with semi bent knee x 15 reps              Assessment & Plan   Assessment:         Patient will continue to benefit from skilled outpatient physical therapy to address the deficits listed in the problem list box on initial evaluation, provide pt/family education and to maximize pt's level of independence in the home and community environment.     Patient's spiritual, cultural, and educational needs considered and patient agreeable to plan of care and goals.           Plan:      Goals:   Active       Ambulation/movement          Range of Motion       Patient will achieve right knee ROM of  ext -0  degrees with quad lag  (Progressing)       Start:  01/24/25    Expected End:  03/21/25               Strength       Patient will achieve right knee flexion strength of 3+/5 (Met)       Start:  01/24/25    Expected End:  02/21/25    Resolved:  02/24/25         Patient will achieve right knee extension strength of 3+/5 (Met)       Start:  01/24/25    Expected End:  02/21/25    Resolved:  02/24/25            Strength       Patient will achieve right knee flexion strength of 5/5 (Progressing)       Start:  01/24/25    Expected End:  03/21/25            Patient will achieve right knee extension strength of 5/5 (Progressing)       Start:  01/24/25    Expected End:  03/21/25               Resolved       Ambulation/movement       Patient will walk independent without an assistive device  (Met)       Start:  01/24/25    Expected End:  02/21/25    Resolved:  02/26/25            Pain       Patient will report pain of 4/10 demonstrating a reduction of overall pain (Met)       Start:  01/24/25    Expected End:  02/21/25    Resolved:  02/17/25            Pain       Patient will report pain of 1/10 demonstrating a reduction of overall pain (Met)       Start:  01/24/25    Expected End:  03/21/25    Resolved:  02/24/25            Range of Motion       Patient will achieve right knee ROM of  flexion -120 degrees  (Met)       Start:  01/24/25    Expected End:  02/21/25    Resolved:  02/24/25           Plan:     Planned therapy interventions include: Therapeutic exercise and Therapeutic activities.    Planned modalities to include: Electrical stimulation - passive/unattended.         Visit Frequency: 3 times Per Week for 8 Weeks.    Akash Puentes, PT

## 2025-03-10 ENCOUNTER — CLINICAL SUPPORT (OUTPATIENT)
Dept: REHABILITATION | Facility: HOSPITAL | Age: 60
End: 2025-03-10
Payer: COMMERCIAL

## 2025-03-10 DIAGNOSIS — Z96.651 S/P TOTAL KNEE ARTHROPLASTY, RIGHT: ICD-10-CM

## 2025-03-10 DIAGNOSIS — Z74.09 DECREASED FUNCTIONAL MOBILITY AND ENDURANCE: Primary | ICD-10-CM

## 2025-03-10 DIAGNOSIS — M25.661 DECREASED ROM OF RIGHT KNEE: ICD-10-CM

## 2025-03-10 PROCEDURE — 97110 THERAPEUTIC EXERCISES: CPT | Mod: PN,CQ

## 2025-03-10 PROCEDURE — 97112 NEUROMUSCULAR REEDUCATION: CPT | Mod: PN,CQ

## 2025-03-10 NOTE — PROGRESS NOTES
Outpatient Rehab    Physical Therapy Visit    Patient Name: Isamar Osorio  MRN: [id number]  YOB: 1965  Encounter Date: 3/10/2025    Therapy Diagnosis:   Encounter Diagnoses   Name Primary?    Decreased functional mobility and endurance Yes    Decreased ROM of right knee     S/P total knee arthroplasty, right      Physician: Teo Richard,*    Physician Orders: Eval and Treat  Date of Evaluation:  1/24/2025   Insurance Authorization Period: 1/1/2025  to end of year   Plan of Care Certification:  1/24/2025 to 3/20/2025    Visit # / Visits Authorized:  20/no limit    PTA VISIT #  1     Time In:  930   Time Out: 1020   Total Time:     Total Billable Time: 50    FOTO:  Intake Score:  %  Survey Score 1:  %  Survey Score 2:  %         Subjective   I'm stiff, but not hurting.  Pain reported as 0/10.      Objective       Knee Range of Motion   Right Knee   Active (deg) Passive (deg) Pain   Flexion 127       Extension 0           Quad lag -3           Treatment:  Balance/Neuromuscular Re-Education  Balance/Neuromuscular Re-Education Activity 1: terminal knee extension with estim 15 x 5#  Balance/Neuromuscular Re-Education Activity 2: quad sets with estim x 25 reps  Balance/Neuromuscular Re-Education Activity 4: slant board x 2 min  Balance/Neuromuscular Re-Education Activity 6: golfer  8 lb x 10 reps    Therapeutic Activity  Therapeutic Activity 1: biking x 6 min seat level 0  Therapeutic Activity 2: stairclimber r le only x 20 to simulate community stair climbing  Therapeutic Activity 4: total gym leg pressing simulating getting up from kneeling level 10 x 20 reps  Therapeutic Activity 5: total gym calf pressing simulaitng toe raising to reach into cabinets x 20 reps with level 10  Therapeutic Activity 6: b hip abd, add 10 x 30# to promote pelvic stability with walking  Therapeutic Activity 8: r ss leg presses 20 x 55# to simulate bending and squatting    Assessment & Plan   Assessment: Brii  still unable to squat for golfers reach without considerable ue support, fatigued qucikly with stairclimber.  Evaluation/Treatment Tolerance: Patient limited by fatigue    Patient will continue to benefit from skilled outpatient physical therapy to address the deficits listed in the problem list box on initial evaluation, provide pt/family education and to maximize pt's level of independence in the home and community environment.     Patient's spiritual, cultural, and educational needs considered and patient agreeable to plan of care and goals.           Plan:  Planned therapy interventions include: Therapeutic exercise and Therapeutic activities.    Planned modalities to include: Electrical stimulation - passive/unattended.      Visit Frequency: 3 times Per Week for 8 Weeks.    Goals:   Active       Ambulation/movement          Range of Motion       Patient will achieve right knee ROM of  ext -0  degrees with quad lag  (Progressing)       Start:  01/24/25    Expected End:  03/21/25               Strength       Patient will achieve right knee flexion strength of 3+/5 (Met)       Start:  01/24/25    Expected End:  02/21/25    Resolved:  02/24/25         Patient will achieve right knee extension strength of 3+/5 (Met)       Start:  01/24/25    Expected End:  02/21/25    Resolved:  02/24/25            Strength       Patient will achieve right knee flexion strength of 5/5 (Progressing)       Start:  01/24/25    Expected End:  03/21/25            Patient will achieve right knee extension strength of 5/5 (Progressing)       Start:  01/24/25    Expected End:  03/21/25              Resolved       Ambulation/movement       Patient will walk independent without an assistive device  (Met)       Start:  01/24/25    Expected End:  02/21/25    Resolved:  02/26/25            Pain       Patient will report pain of 4/10 demonstrating a reduction of overall pain (Met)       Start:  01/24/25    Expected End:  02/21/25    Resolved:   02/17/25            Pain       Patient will report pain of 1/10 demonstrating a reduction of overall pain (Met)       Start:  01/24/25    Expected End:  03/21/25    Resolved:  02/24/25            Range of Motion       Patient will achieve right knee ROM of  flexion -120 degrees  (Met)       Start:  01/24/25    Expected End:  02/21/25    Resolved:  02/24/25             Rajni Bond, PTA

## 2025-03-12 ENCOUNTER — CLINICAL SUPPORT (OUTPATIENT)
Dept: REHABILITATION | Facility: HOSPITAL | Age: 60
End: 2025-03-12
Payer: COMMERCIAL

## 2025-03-12 DIAGNOSIS — Z96.651 S/P TOTAL KNEE ARTHROPLASTY, RIGHT: ICD-10-CM

## 2025-03-12 DIAGNOSIS — M25.661 DECREASED ROM OF RIGHT KNEE: Primary | ICD-10-CM

## 2025-03-12 DIAGNOSIS — Z74.09 DECREASED FUNCTIONAL MOBILITY AND ENDURANCE: ICD-10-CM

## 2025-03-12 PROCEDURE — 97530 THERAPEUTIC ACTIVITIES: CPT | Mod: PN,CQ

## 2025-03-12 PROCEDURE — 97112 NEUROMUSCULAR REEDUCATION: CPT | Mod: PN,CQ

## 2025-03-12 NOTE — PROGRESS NOTES
"  Outpatient Rehab    Physical Therapy Visit    Patient Name: Isamar Osorio  MRN: [id number]  YOB: 1965  Encounter Date: 3/12/2025    Therapy Diagnosis:   Encounter Diagnoses   Name Primary?    Decreased ROM of right knee Yes    S/P total knee arthroplasty, right     Decreased functional mobility and endurance      Physician: Teo Richard,*    Physician Orders: Eval and Treat  Date of Evaluation:  1/24/2025   Insurance Authorization Period: 1/1/2025  to end of year   Plan of Care Certification:  1/24/2025 to 3/20/2025    Visit # / Visits Authorized:  21/no limit    PTA VISIT #  2     Time In: 0930   Time Out: 1015  Total Time: 45   Total Billable Time: 45 minutes     FOTO:  Intake Score:  %  Survey Score 1:  %  Survey Score 2:  %         Subjective   I'm stiff, but not hurting.  Pain reported as 0/10. stiffness pain    Objective            Treatment:  Balance/Neuromuscular Re-Education  Balance/Neuromuscular Re-Education Activity 1: terminal knee extension with estim 15 x 5#  Balance/Neuromuscular Re-Education Activity 2: quad sets with estim x 25 reps  Balance/Neuromuscular Re-Education Activity 4: slant board x 2 min  Balance/Neuromuscular Re-Education Activity 6: golfer  8 lb x 5 reps  Balance/Neuromuscular Re-Education Activity 9: Foward/Lateral step downs on 4" step 10x    Therapeutic Activity  Therapeutic Activity 1: biking x 6 min seat level 0  Therapeutic Activity 4: total gym leg pressing simulating getting up from kneeling level 10 x 20 reps  Therapeutic Activity 5: total gym calf pressing simulaitng toe raising to reach into cabinets x 20 reps with level 10  Therapeutic Activity 6: b hip abd, add 10 x 30# to promote pelvic stability with walking  Therapeutic Activity 7: swissball knee flextion with towel x 15 to promote functional flexion for walking    Assessment & Plan   Assessment: patient improved balance while performing golfers reach but still required BUE support due to " weakness/fear of falling.  Evaluation/Treatment Tolerance: Patient tolerated treatment well    Patient will continue to benefit from skilled outpatient physical therapy to address the deficits listed in the problem list box on initial evaluation, provide pt/family education and to maximize pt's level of independence in the home and community environment.     Patient's spiritual, cultural, and educational needs considered and patient agreeable to plan of care and goals.           Plan: Planned therapy interventions include: Therapeutic exercise and Therapeutic activities.    Planned modalities to include: Electrical stimulation - passive/unattended.  Visit Frequency: 3 times Per Week for 8 Weeks.    Goals:   Active       Ambulation/movement          Range of Motion       Patient will achieve right knee ROM of  ext -0  degrees with quad lag  (Progressing)       Start:  01/24/25    Expected End:  03/21/25               Strength       Patient will achieve right knee flexion strength of 3+/5 (Met)       Start:  01/24/25    Expected End:  02/21/25    Resolved:  02/24/25         Patient will achieve right knee extension strength of 3+/5 (Met)       Start:  01/24/25    Expected End:  02/21/25    Resolved:  02/24/25            Strength       Patient will achieve right knee flexion strength of 5/5 (Progressing)       Start:  01/24/25    Expected End:  03/21/25            Patient will achieve right knee extension strength of 5/5 (Progressing)       Start:  01/24/25    Expected End:  03/21/25              Resolved       Ambulation/movement       Patient will walk independent without an assistive device  (Met)       Start:  01/24/25    Expected End:  02/21/25    Resolved:  02/26/25            Pain       Patient will report pain of 4/10 demonstrating a reduction of overall pain (Met)       Start:  01/24/25    Expected End:  02/21/25    Resolved:  02/17/25            Pain       Patient will report pain of 1/10 demonstrating a  reduction of overall pain (Met)       Start:  01/24/25    Expected End:  03/21/25    Resolved:  02/24/25            Range of Motion       Patient will achieve right knee ROM of  flexion -120 degrees  (Met)       Start:  01/24/25    Expected End:  02/21/25    Resolved:  02/24/25             Jocelyne Colunga PTA

## 2025-03-14 ENCOUNTER — CLINICAL SUPPORT (OUTPATIENT)
Dept: REHABILITATION | Facility: HOSPITAL | Age: 60
End: 2025-03-14
Payer: COMMERCIAL

## 2025-03-14 DIAGNOSIS — Z96.651 S/P TOTAL KNEE ARTHROPLASTY, RIGHT: ICD-10-CM

## 2025-03-14 DIAGNOSIS — Z74.09 DECREASED FUNCTIONAL MOBILITY AND ENDURANCE: ICD-10-CM

## 2025-03-14 DIAGNOSIS — M25.661 DECREASED ROM OF RIGHT KNEE: Primary | ICD-10-CM

## 2025-03-14 PROCEDURE — 97112 NEUROMUSCULAR REEDUCATION: CPT | Mod: PN,CQ

## 2025-03-14 PROCEDURE — 97530 THERAPEUTIC ACTIVITIES: CPT | Mod: PN,CQ

## 2025-03-14 NOTE — PROGRESS NOTES
"  Outpatient Rehab    Physical Therapy Visit    Patient Name: Isamar Osorio  MRN: [id number]  YOB: 1965  Encounter Date: 3/14/2025    Therapy Diagnosis:   Encounter Diagnoses   Name Primary?    Decreased ROM of right knee Yes    S/P total knee arthroplasty, right     Decreased functional mobility and endurance      Physician: Teo Richard,*    Physician Orders: Eval and Treat  Date of Evaluation:  1/24/2025   Insurance Authorization Period: 1/1/2025  to end of year   Plan of Care Certification:  1/24/2025 to 3/20/2025    Visit # / Visits Authorized:  22/no limit    PTA VISIT #  3     Time In:  930  Time Out: 1015   Total Time:     Total Billable Time: 45    FOTO:  Intake Score:  %  Survey Score 1:  %  Survey Score 2:  %         Subjective   Im having more swelling in my ankle, but been up doing more..  Pain reported as 1/10.      Objective       Knee Range of Motion   Right Knee   Active (deg) Passive (deg) Pain   Flexion 127       Extension 3           Quad lag -3           Treatment:  Balance/Neuromuscular Re-Education  Balance/Neuromuscular Re-Education Activity 1: terminal knee extension with estim 15 x 5#  Balance/Neuromuscular Re-Education Activity 2: quad sets with estim x 25 reps  Balance/Neuromuscular Re-Education Activity 3: straight leg raise x 15 reps using strap today  Balance/Neuromuscular Re-Education Activity 4: slant board x 2 min  Balance/Neuromuscular Re-Education Activity 9: Foward/Lateral step downs on 4" step 10x    Therapeutic Activity  Therapeutic Activity 1: biking x 6 min seat level 0  Therapeutic Activity 2: stairclimber r le only x 20 to simulate community stair climbing  Therapeutic Activity 3: double support calf pressing with 70lb x 20 reps for toe raising to reach into cabinets  Therapeutic Activity 4: total gym leg pressing simulating getting up from kneeling level 10 x 20 reps  Therapeutic Activity 5: total gym calf pressing simulaitng toe raising to reach into " cabinets x 20 reps with level 10  Therapeutic Activity 7: supine flexion with towel x 15 to promote functional flexion for walking  Therapeutic Activity 8: r ss leg presses 20 x 55# to simulate bending and squatting    Assessment & Plan   Assessment:         Patient will continue to benefit from skilled outpatient physical therapy to address the deficits listed in the problem list box on initial evaluation, provide pt/family education and to maximize pt's level of independence in the home and community environment.     Patient's spiritual, cultural, and educational needs considered and patient agreeable to plan of care and goals.           Plan:  Planned therapy interventions include: Therapeutic exercise and Therapeutic activities. Planned modalities to include: Electrical stimulation - passive/unattended. Visit Frequency: 3 times Per Week for 8 Weeks.     Goals:   Active       Ambulation/movement          Range of Motion       Patient will achieve right knee ROM of  ext -0  degrees with quad lag  (Progressing)       Start:  01/24/25    Expected End:  03/21/25               Strength       Patient will achieve right knee flexion strength of 3+/5 (Met)       Start:  01/24/25    Expected End:  02/21/25    Resolved:  02/24/25         Patient will achieve right knee extension strength of 3+/5 (Met)       Start:  01/24/25    Expected End:  02/21/25    Resolved:  02/24/25            Strength       Patient will achieve right knee flexion strength of 5/5 (Progressing)       Start:  01/24/25    Expected End:  03/21/25            Patient will achieve right knee extension strength of 5/5 (Progressing)       Start:  01/24/25    Expected End:  03/21/25              Resolved       Ambulation/movement       Patient will walk independent without an assistive device  (Met)       Start:  01/24/25    Expected End:  02/21/25    Resolved:  02/26/25            Pain       Patient will report pain of 4/10 demonstrating a reduction of  overall pain (Met)       Start:  01/24/25    Expected End:  02/21/25    Resolved:  02/17/25            Pain       Patient will report pain of 1/10 demonstrating a reduction of overall pain (Met)       Start:  01/24/25    Expected End:  03/21/25    Resolved:  02/24/25            Range of Motion       Patient will achieve right knee ROM of  flexion -120 degrees  (Met)       Start:  01/24/25    Expected End:  02/21/25    Resolved:  02/24/25             Rajni Bond, PTA

## 2025-03-17 ENCOUNTER — CLINICAL SUPPORT (OUTPATIENT)
Dept: REHABILITATION | Facility: HOSPITAL | Age: 60
End: 2025-03-17
Payer: COMMERCIAL

## 2025-03-17 DIAGNOSIS — Z74.09 DECREASED FUNCTIONAL MOBILITY AND ENDURANCE: ICD-10-CM

## 2025-03-17 DIAGNOSIS — Z96.651 S/P TOTAL KNEE ARTHROPLASTY, RIGHT: Primary | ICD-10-CM

## 2025-03-17 DIAGNOSIS — M25.661 DECREASED ROM OF RIGHT KNEE: ICD-10-CM

## 2025-03-17 PROCEDURE — 97530 THERAPEUTIC ACTIVITIES: CPT | Mod: PN

## 2025-03-17 PROCEDURE — 97112 NEUROMUSCULAR REEDUCATION: CPT | Mod: PN

## 2025-03-17 NOTE — PROGRESS NOTES
"  Outpatient Rehab    Physical Therapy Visit    Patient Name: Isamar Osorio  MRN: [id number]  YOB: 1965  Encounter Date: 3/17/2025    Therapy Diagnosis:   Encounter Diagnoses   Name Primary?    S/P total knee arthroplasty, right Yes    Decreased ROM of right knee     Decreased functional mobility and endurance      Physician: Teo Richard,*    Physician Orders: Eval and Treat  Medical Diagnosis: S/P total knee arthroplasty, right     Physician Orders: Eval and Treat  Date of Evaluation:  1/24/2025   Insurance Authorization Period: 1/1/2025  to end of year   Plan of Care Certification:  1/24/2025 to 3/20/2025    Visit # / Visits Authorized:  23/no limit    PTA VISIT #       PT/PTA:     Number of PTA visits since last PT visit:   Time In: 0926   Time Out: 1022  Total Time: 56   Total Billable Time: 56    FOTO:  Intake Score:  %  Survey Score 1:  %  Survey Score 2:  %         Subjective   was swollen after being barefooted this weekend.  Pain reported as 0/10.      Objective       Knee Range of Motion   Right Knee   Active (deg) Passive (deg) Pain   Flexion 127 130     Extension 0           Quad lag with terminal knee extension -5           Treatment:  Manual Therapy  MT 1: prom to right knee x 8 min  Balance/Neuromuscular Re-Education  NMR 1: terminal knee extension with estim 15 x 5#  NMR 2: quad sets with estim x 25 reps  NMR 3: straight leg raise x 15 reps using strap today  NMR 4: slant board x 2 min  NMR 5: lright hs stretch on step x 5  NMR 6: golfer  8 lb x 5 reps  NMR 7: fred ss balance without ue support x 5  NMR 8: heel-toe walking and retro 2 x 25'  NMR 9: Foward/Lateral step downs on 4" step 10x  Therapeutic Activity  TA 1: biking x 6 min seat level 0  TA 2: stairclimber r le only x 20 to simulate community stair climbing  TA 3: double support calf pressing with 70lb x 20 reps for toe raising to reach into cabinets  TA 4: total gym leg pressing simulating getting up from " kneeling level 10 x 20 reps  TA 5: total gym calf pressing simulaitng toe raising to reach into cabinets x 20 reps with level 10  TA 6: b hip abd, add 10 x 30# to promote pelvic stability with walking  TA 7: supine flexion with towel x 15 to promote functional flexion for walking  TA 8: r ss leg presses 20 x 55# to simulate bending and squatting  TA 9: treadmill 1.1mph x 6' to promote endurance with walking and functional mobility  TA 10: plyometric ball toss 3 ways with semi bent knee x 15 reps    Time Entry(in minutes):  Manual Therapy Time Entry: 5  Neuromuscular Re-Education Time Entry: 20  Therapeutic Activity Time Entry: 25    Assessment & Plan   Assessment: pt improving with quad strength pt having tight it band       Patient will continue to benefit from skilled outpatient physical therapy to address the deficits listed in the problem list box on initial evaluation, provide pt/family education and to maximize pt's level of independence in the home and community environment.     Patient's spiritual, cultural, and educational needs considered and patient agreeable to plan of care and goals.           Plan:      Goals:   Active       Ambulation/movement          Range of Motion       Patient will achieve right knee ROM of  ext -0  degrees with quad lag  (Progressing)       Start:  01/24/25    Expected End:  03/21/25               Strength       Patient will achieve right knee flexion strength of 3+/5 (Met)       Start:  01/24/25    Expected End:  02/21/25    Resolved:  02/24/25         Patient will achieve right knee extension strength of 3+/5 (Met)       Start:  01/24/25    Expected End:  02/21/25    Resolved:  02/24/25            Strength       Patient will achieve right knee flexion strength of 5/5 (Progressing)       Start:  01/24/25    Expected End:  03/21/25            Patient will achieve right knee extension strength of 5/5 (Progressing)       Start:  01/24/25    Expected End:  03/21/25               Resolved       Ambulation/movement       Patient will walk independent without an assistive device  (Met)       Start:  01/24/25    Expected End:  02/21/25    Resolved:  02/26/25            Pain       Patient will report pain of 4/10 demonstrating a reduction of overall pain (Met)       Start:  01/24/25    Expected End:  02/21/25    Resolved:  02/17/25            Pain       Patient will report pain of 1/10 demonstrating a reduction of overall pain (Met)       Start:  01/24/25    Expected End:  03/21/25    Resolved:  02/24/25            Range of Motion       Patient will achieve right knee ROM of  flexion -120 degrees  (Met)       Start:  01/24/25    Expected End:  02/21/25    Resolved:  02/24/25             Reasons for Recertification of Therapy: cont PT     Plan     Updated Certification Period: 3/17/2025 to 4/17/2025   Recommended Treatment Plan: 2 times per week for 8 weeks: Electrical Stimulation nmes, Manual Therapy, Neuromuscular Re-ed, Patient Education, Therapeutic Activities, and Therapeutic Exercise  Other Recommendations: cont  Quad strength and range of motion and balance     Akash Puentes PT  3/17/2025      I CERTIFY THE NEED FOR THESE SERVICES FURNISHED UNDER THIS PLAN OF TREATMENT AND WHILE UNDER MY CARE.    Physician's comments:      Physician's Signature: ___________________________________________________   Akash Puentes PT

## 2025-03-17 NOTE — Clinical Note
March 17, 2025  Teo Richard MD  [address] [facility] [place] [address]    To whom it may concern,     The attached plan of care is being sent to you for review and reference.    You may indicate your approval by signing the document electronically, or by faxing/mailing a signed copy of the final page of this document back to the attention of Akash Puentes, PT:         Plan of Care 3/17/25   Effective from: 3/17/2025  Effective to: 4/17/2025    Plan ID: [id number]            Participants as of Finalize on 3/17/2025    Name Type Comments Contact Info    Teo Rihcard MD Referring Provider  [phone]    Akash Puentes, PT Physical Therapist         Last Plan Note     Author: Akash Puentes PT Status: Signed Last edited: 3/17/2025  9:30 AM         Outpatient Rehab    Physical Therapy Visit    Patient Name: Isamar Osorio  MRN: [id number]  YOB: 1965  Encounter Date: 3/17/2025    Therapy Diagnosis:   Encounter Diagnoses   Name Primary?    S/P total knee arthroplasty, right Yes    Decreased ROM of right knee     Decreased functional mobility and endurance      Physician: Teo Richard,*    Physician Orders: Eval and Treat  Medical Diagnosis: S/P total knee arthroplasty, right     Physician Orders: Eval and Treat  Date of Evaluation:  1/24/2025   Insurance Authorization Period: 1/1/2025  to end of year   Plan of Care Certification:  1/24/2025 to 3/20/2025    Visit # / Visits Authorized:  23/no limit    PTA VISIT #       PT/PTA:     Number of PTA visits since last PT visit:   Time In: 0926   Time Out: 1022  Total Time: 56   Total Billable Time: 56    FOTO:  Intake Score:  %  Survey Score 1:  %  Survey Score 2:  %         Subjective   was swollen after being barefooted this weekend.  Pain reported as 0/10.      Objective       Knee Range of Motion   Right Knee   Active (deg) Passive (deg) Pain   Flexion 127 130     Extension 0           Quad lag with terminal knee extension  "-5           Treatment:  Manual Therapy  MT 1: prom to right knee x 8 min  Balance/Neuromuscular Re-Education  NMR 1: terminal knee extension with estim 15 x 5#  NMR 2: quad sets with estim x 25 reps  NMR 3: straight leg raise x 15 reps using strap today  NMR 4: slant board x 2 min  NMR 5: lright hs stretch on step x 5  NMR 6: golfer  8 lb x 5 reps  NMR 7: fred ss balance without ue support x 5  NMR 8: heel-toe walking and retro 2 x 25'  NMR 9: Foward/Lateral step downs on 4" step 10x  Therapeutic Activity  TA 1: biking x 6 min seat level 0  TA 2: stairclimber r le only x 20 to simulate community stair climbing  TA 3: double support calf pressing with 70lb x 20 reps for toe raising to reach into cabinets  TA 4: total gym leg pressing simulating getting up from kneeling level 10 x 20 reps  TA 5: total gym calf pressing simulaitng toe raising to reach into cabinets x 20 reps with level 10  TA 6: b hip abd, add 10 x 30# to promote pelvic stability with walking  TA 7: supine flexion with towel x 15 to promote functional flexion for walking  TA 8: r ss leg presses 20 x 55# to simulate bending and squatting  TA 9: treadmill 1.1mph x 6' to promote endurance with walking and functional mobility  TA 10: plyometric ball toss 3 ways with semi bent knee x 15 reps    Time Entry(in minutes):  Manual Therapy Time Entry: 5  Neuromuscular Re-Education Time Entry: 20  Therapeutic Activity Time Entry: 25    Assessment & Plan   Assessment: pt improving with quad strength pt having tight it band       Patient will continue to benefit from skilled outpatient physical therapy to address the deficits listed in the problem list box on initial evaluation, provide pt/family education and to maximize pt's level of independence in the home and community environment.     Patient's spiritual, cultural, and educational needs considered and patient agreeable to plan of care and goals.           Plan:      Goals:   Active       " Ambulation/movement          Range of Motion       Patient will achieve right knee ROM of  ext -0  degrees with quad lag  (Progressing)       Start:  01/24/25    Expected End:  03/21/25               Strength       Patient will achieve right knee flexion strength of 3+/5 (Met)       Start:  01/24/25    Expected End:  02/21/25    Resolved:  02/24/25         Patient will achieve right knee extension strength of 3+/5 (Met)       Start:  01/24/25    Expected End:  02/21/25    Resolved:  02/24/25            Strength       Patient will achieve right knee flexion strength of 5/5 (Progressing)       Start:  01/24/25    Expected End:  03/21/25            Patient will achieve right knee extension strength of 5/5 (Progressing)       Start:  01/24/25    Expected End:  03/21/25              Resolved       Ambulation/movement       Patient will walk independent without an assistive device  (Met)       Start:  01/24/25    Expected End:  02/21/25    Resolved:  02/26/25            Pain       Patient will report pain of 4/10 demonstrating a reduction of overall pain (Met)       Start:  01/24/25    Expected End:  02/21/25    Resolved:  02/17/25            Pain       Patient will report pain of 1/10 demonstrating a reduction of overall pain (Met)       Start:  01/24/25    Expected End:  03/21/25    Resolved:  02/24/25            Range of Motion       Patient will achieve right knee ROM of  flexion -120 degrees  (Met)       Start:  01/24/25    Expected End:  02/21/25    Resolved:  02/24/25             Reasons for Recertification of Therapy: cont PT     Plan     Updated Certification Period: 3/17/2025 to 4/17/2025   Recommended Treatment Plan: 2 times per week for 8 weeks: Electrical Stimulation nmes, Manual Therapy, Neuromuscular Re-ed, Patient Education, Therapeutic Activities, and Therapeutic Exercise  Other Recommendations: cont  Quad strength and range of motion and balance     Akash Puentes, PT  3/17/2025      I CERTIFY THE NEED FOR  THESE SERVICES FURNISHED UNDER THIS PLAN OF TREATMENT AND WHILE UNDER MY CARE.    Physician's comments:      Physician's Signature: ___________________________________________________   Akash Puentes PT           Current Participants as of 3/17/2025    Name Type Comments Contact Info    Teo Richard MD Referring Provider  [phone]    Signature pending    Akash Puentes PT Physical Therapist                  Sincerely,      Akash Puentes PT  [facility]                                                            Dear Akash Puentes, PT,    RE: Ms. Isamar Osorio, MRN: [id number]    I certify that I have reviewed the attached plan of care and agree to the details within.        ___________________________  ___________________________  Provider Printed Name   Provider Signed Name      ___________________________  Date and Time

## 2025-03-19 ENCOUNTER — CLINICAL SUPPORT (OUTPATIENT)
Dept: REHABILITATION | Facility: HOSPITAL | Age: 60
End: 2025-03-19
Payer: COMMERCIAL

## 2025-03-19 DIAGNOSIS — R29.898 DECREASED STRENGTH INVOLVING KNEE JOINT: ICD-10-CM

## 2025-03-19 DIAGNOSIS — Z96.651 S/P TOTAL KNEE ARTHROPLASTY, RIGHT: Primary | ICD-10-CM

## 2025-03-19 DIAGNOSIS — M25.661 DECREASED ROM OF RIGHT KNEE: ICD-10-CM

## 2025-03-19 PROCEDURE — 97112 NEUROMUSCULAR REEDUCATION: CPT | Mod: PN,CQ

## 2025-03-19 PROCEDURE — 97530 THERAPEUTIC ACTIVITIES: CPT | Mod: PN,CQ

## 2025-03-19 NOTE — PROGRESS NOTES
"  Outpatient Rehab    Physical Therapy Visit    Patient Name: Isamar Osorio  MRN: [id number]  YOB: 1965  Encounter Date: 3/19/2025    Therapy Diagnosis:   Encounter Diagnoses   Name Primary?    S/P total knee arthroplasty, right Yes    Decreased ROM of right knee     Decreased strength involving knee joint      Physician: Teo Richard,*    Physician Orders: Eval and Treat  Medical Diagnosis: S/P total knee arthroplasty, right    Visit # / Visits Authorized:  24/30   Physician Orders: Eval and Treat  Date of Evaluation:  1/24/2025   Insurance Authorization Period: 1/1/2025  to end of year   Plan of Care Certification:  1/24/2025 to 3/20/2025    PTA VISIT #  1    Time In: 922    Time Out: 1010   Total Time:     Total Billable Time: 48    FOTO:  Intake Score:  %  Survey Score 1:  %  Survey Score 2:  %         Subjective   I'm doing ok this am.  Pain reported as 0/10.      Objective       Knee Range of Motion   Right Knee   Active (deg) Passive (deg) Pain   Flexion 127       Extension 0           Quad lag with terminal knee extension -5           Treatment:  Balance/Neuromuscular Re-Education  NMR 2: quad sets with estim x 25 reps  NMR 3: straight leg raise x 15 reps using strap today  NMR 4: slant board x 2 min  NMR 9: right /Lateral step downs on 6" step x 10  Therapeutic Activity  TA 1: biking x 8 min seat level 0 intensity 2 to promote endurance with community walking  TA 2: stairclimber r le only x 20 to simulate community stair climbing  TA 4: total gym leg pressing simulating getting up from kneeling level 10 x 20 reps  TA 5: total gym calf pressing simulaitng toe raising to reach into cabinets x 20 reps with level 10  TA 6: b hip abd, add 10 x 30# to promote pelvic stability with walking  TA 7: supine flexion with towel x 15 to promote functional flexion for walking  TA 8: r ss leg presses 20 x 55# to simulate bending and squatting           Assessment & Plan   Assessment:         Patient " will continue to benefit from skilled outpatient physical therapy to address the deficits listed in the problem list box on initial evaluation, provide pt/family education and to maximize pt's level of independence in the home and community environment.     Patient's spiritual, cultural, and educational needs considered and patient agreeable to plan of care and goals.           Plan:      Goals:   Active       Ambulation/movement          Range of Motion       Patient will achieve right knee ROM of  ext -0  degrees with quad lag  (Progressing)       Start:  01/24/25    Expected End:  03/21/25               Strength       Patient will achieve right knee flexion strength of 3+/5 (Met)       Start:  01/24/25    Expected End:  02/21/25    Resolved:  02/24/25         Patient will achieve right knee extension strength of 3+/5 (Met)       Start:  01/24/25    Expected End:  02/21/25    Resolved:  02/24/25            Strength       Patient will achieve right knee flexion strength of 5/5 (Progressing)       Start:  01/24/25    Expected End:  03/21/25            Patient will achieve right knee extension strength of 5/5 (Progressing)       Start:  01/24/25    Expected End:  03/21/25              Resolved       Ambulation/movement       Patient will walk independent without an assistive device  (Met)       Start:  01/24/25    Expected End:  02/21/25    Resolved:  02/26/25            Pain       Patient will report pain of 4/10 demonstrating a reduction of overall pain (Met)       Start:  01/24/25    Expected End:  02/21/25    Resolved:  02/17/25            Pain       Patient will report pain of 1/10 demonstrating a reduction of overall pain (Met)       Start:  01/24/25    Expected End:  03/21/25    Resolved:  02/24/25            Range of Motion       Patient will achieve right knee ROM of  flexion -120 degrees  (Met)       Start:  01/24/25    Expected End:  02/21/25    Resolved:  02/24/25           Plan: Updated Certification  Period: 3/17/2025 to 4/17/2025   Recommended Treatment Plan: 2 times per week for 8 weeks: Electrical Stimulation nmes, Manual Therapy, Neuromuscular Re-ed, Patient Education, Therapeutic Activities, and Therapeutic Exercise  Rajni Bond, PTA

## 2025-03-21 ENCOUNTER — CLINICAL SUPPORT (OUTPATIENT)
Dept: REHABILITATION | Facility: HOSPITAL | Age: 60
End: 2025-03-21
Payer: COMMERCIAL

## 2025-03-21 DIAGNOSIS — Z96.651 S/P TOTAL KNEE ARTHROPLASTY, RIGHT: Primary | ICD-10-CM

## 2025-03-21 PROCEDURE — 97530 THERAPEUTIC ACTIVITIES: CPT | Mod: PN,CQ

## 2025-03-21 PROCEDURE — 97112 NEUROMUSCULAR REEDUCATION: CPT | Mod: PN,CQ

## 2025-03-21 NOTE — PROGRESS NOTES
Outpatient Rehab    Physical Therapy Visit    Patient Name: Isamar Osorio  MRN: [id number]  YOB: 1965  Encounter Date: 3/21/2025    Therapy Diagnosis: No diagnosis found.  Physician: Teo Richard,*    Physician Orders: Eval and Treat  Medical Diagnosis: S/P total knee arthroplasty, right    Visit # / Visits Authorized:  25/30  Pta visit# 2   Physician Orders: Eval and Treat  Date of Evaluation:  1/24/2025   Insurance Authorization Period: 1/1/2025  to end of year   Plan of Care Certification:  1/24/2025 to 3/20/2025      Time In: 930    Time Out:1025    Total Time:     Total Billable Time: 55    FOTO:  Intake Score:  %  Survey Score 1:  %  Survey Score 2:  %         Subjective   I still have some times I get off balance, but I think it's from a previous ankle injury.  Pain reported as 0/10.      Objective       Knee Range of Motion   Right Knee   Active (deg) Passive (deg) Pain   Flexion 127       Extension 2           Quad lag with terminal knee extension -5           Treatment:  Balance/Neuromuscular Re-Education  NMR 1: terminal knee extension with estim 15 x 5#  NMR 2: quad sets with estim x 25 reps  NMR 3: straight leg raise x 15 reps  NMR 4: slant board x 2 min  NMR 6: BOSU squats x 10  Therapeutic Activity  TA 1: biking x 6 min seat level 0 intensity 2 to promote endurance with community walking  TA 2: stairclimber r le only x 20 to simulate community stair climbing  TA 4: total gym leg pressing simulating getting up from kneeling level 10 x 20 reps  TA 5: total gym calf pressing simulaitng toe raising to reach into cabinets x 20 reps with level 10  TA 6: b hip abd, add 10 x 30# to promote pelvic stability with walking  TA 7: supine flexion with towel x 15 to promote functional flexion for walking  TA 10: plyometric ball toss 3 ways with semi bent knee x 15 reps    Neuromuscular Re-Education Time Entry: 25  Therapeutic Activity Time Entry: 30    Assessment & Plan   Assessment: rupal had  fair balance with bosu squats, required  ue support.  Evaluation/Treatment Tolerance: Patient tolerated treatment well    Patient will continue to benefit from skilled outpatient physical therapy to address the deficits listed in the problem list box on initial evaluation, provide pt/family education and to maximize pt's level of independence in the home and community environment.     Patient's spiritual, cultural, and educational needs considered and patient agreeable to plan of care and goals.           Plan:   Updated Certification Period: 3/17/2025 to 4/17/2025   Recommended Treatment Plan: 2 times per week for 8 weeks: Electrical Stimulation nmes, Manual Therapy, Neuromuscular Re-ed, Patient Education, Therapeutic Activities, and Therapeutic Exercise    Goals:   Active       Ambulation/movement          Range of Motion       Patient will achieve right knee ROM of  ext -0  degrees with quad lag  (Progressing)       Start:  01/24/25    Expected End:  03/21/25               Strength       Patient will achieve right knee flexion strength of 3+/5 (Met)       Start:  01/24/25    Expected End:  02/21/25    Resolved:  02/24/25         Patient will achieve right knee extension strength of 3+/5 (Met)       Start:  01/24/25    Expected End:  02/21/25    Resolved:  02/24/25            Strength       Patient will achieve right knee flexion strength of 5/5 (Progressing)       Start:  01/24/25    Expected End:  03/21/25            Patient will achieve right knee extension strength of 5/5 (Progressing)       Start:  01/24/25    Expected End:  03/21/25              Resolved       Ambulation/movement       Patient will walk independent without an assistive device  (Met)       Start:  01/24/25    Expected End:  02/21/25    Resolved:  02/26/25            Pain       Patient will report pain of 4/10 demonstrating a reduction of overall pain (Met)       Start:  01/24/25    Expected End:  02/21/25    Resolved:  02/17/25             Pain       Patient will report pain of 1/10 demonstrating a reduction of overall pain (Met)       Start:  01/24/25    Expected End:  03/21/25    Resolved:  02/24/25            Range of Motion       Patient will achieve right knee ROM of  flexion -120 degrees  (Met)       Start:  01/24/25    Expected End:  02/21/25    Resolved:  02/24/25             Rajni Bond, PTA

## 2025-03-26 ENCOUNTER — CLINICAL SUPPORT (OUTPATIENT)
Dept: REHABILITATION | Facility: HOSPITAL | Age: 60
End: 2025-03-26
Payer: COMMERCIAL

## 2025-03-26 DIAGNOSIS — M25.661 DECREASED ROM OF RIGHT KNEE: ICD-10-CM

## 2025-03-26 DIAGNOSIS — R29.898 DECREASED STRENGTH INVOLVING KNEE JOINT: ICD-10-CM

## 2025-03-26 DIAGNOSIS — Z96.651 S/P TOTAL KNEE ARTHROPLASTY, RIGHT: Primary | ICD-10-CM

## 2025-03-26 PROCEDURE — 97112 NEUROMUSCULAR REEDUCATION: CPT | Mod: PN,CQ

## 2025-03-26 PROCEDURE — 97530 THERAPEUTIC ACTIVITIES: CPT | Mod: PN,CQ

## 2025-03-26 NOTE — PROGRESS NOTES
"  Outpatient Rehab    Physical Therapy Visit    Patient Name: Isamar Osorio  MRN: [id number]  YOB: 1965  Encounter Date: 3/26/2025    Therapy Diagnosis:   Encounter Diagnoses   Name Primary?    S/P total knee arthroplasty, right Yes    Decreased ROM of right knee     Decreased strength involving knee joint      Physician: Teo Richard,*    Physician Orders: Eval and Treat  Medical Diagnosis: S/P total knee arthroplasty, right    Visit # / Visits Authorized:  25 / 30  Pta visit# 3  Insurance Authorization Period: 1/24/2025 to 4/17/2025  Date of Evaluation:  1/24/2025         Time In: 930    Time Out: 1025   Total Time:     Total Billable Time:  55    FOTO:  Intake Score:  %  Survey Score 1:  %  Survey Score 2:  %         Subjective   I'm having the popping/ pulling sensation in the back of my knee again, but I did a lot sitting the other day..  Pain reported as 0/10.      Objective       Knee Range of Motion   Right Knee   Active (deg) Passive (deg) Pain   Flexion 128       Extension 2           Quad lag with terminal knee extension -4           Treatment:  Balance/Neuromuscular Re-Education  NMR 1: terminal knee extension with estim 15 x 5#  NMR 2: quad sets with estim x 25 reps  NMR 3: straight leg raise x 8 reps  NMR 4: slant board x 2 min  NMR 8: heel-toe walking and retro 2 x 25'  NMR 9: 6" forward step downs onto left le x 10  Therapeutic Activity  TA 1: biking x 6 min seat level 0 intensity 2 to promote endurance with community walking  TA 2: stairclimber r le only x 20 to simulate community stair climbing  TA 4: total gym leg pressing simulating getting up from kneeling level 10 x 20 reps  TA 5: total gym calf pressing simulaitng toe raising to reach into cabinets x 20 reps with level 10  TA 6: b hip abd, add 10 x 30# to promote pelvic stability with walking  TA 7: supine flexion with towel x 15 to promote functional flexion for walking  TA 8: r ss leg presses 20 x 55# to simulate " bending and squatting  TA 9: r ss toe presses 15 x 55# to simulate reaching on tip toes  TA 10: plyometric ball toss 3 ways with semi bent knee x 15 reps           Assessment & Plan   Assessment:         Patient will continue to benefit from skilled outpatient physical therapy to address the deficits listed in the problem list box on initial evaluation, provide pt/family education and to maximize pt's level of independence in the home and community environment.     Patient's spiritual, cultural, and educational needs considered and patient agreeable to plan of care and goals.           Goals:   Active       Ambulation/movement          Range of Motion       Patient will achieve right knee ROM of  ext -0  degrees with quad lag  (Progressing)       Start:  01/24/25    Expected End:  03/21/25               Strength       Patient will achieve right knee flexion strength of 3+/5 (Met)       Start:  01/24/25    Expected End:  02/21/25    Resolved:  02/24/25         Patient will achieve right knee extension strength of 3+/5 (Met)       Start:  01/24/25    Expected End:  02/21/25    Resolved:  02/24/25            Strength       Patient will achieve right knee flexion strength of 5/5 (Progressing)       Start:  01/24/25    Expected End:  03/21/25            Patient will achieve right knee extension strength of 5/5 (Progressing)       Start:  01/24/25    Expected End:  03/21/25              Resolved       Ambulation/movement       Patient will walk independent without an assistive device  (Met)       Start:  01/24/25    Expected End:  02/21/25    Resolved:  02/26/25            Pain       Patient will report pain of 4/10 demonstrating a reduction of overall pain (Met)       Start:  01/24/25    Expected End:  02/21/25    Resolved:  02/17/25            Pain       Patient will report pain of 1/10 demonstrating a reduction of overall pain (Met)       Start:  01/24/25    Expected End:  03/21/25    Resolved:  02/24/25             Range of Motion       Patient will achieve right knee ROM of  flexion -120 degrees  (Met)       Start:  01/24/25    Expected End:  02/21/25    Resolved:  02/24/25         Plan :Planned therapy interventions include: Therapeutic exercise and Therapeutic activities.    Planned modalities to include: Electrical stimulation - passive/unattended.         Visit Frequency: 3 times Per Week for 8 Weeks.    Rajni Bond, PTA

## 2025-03-28 ENCOUNTER — CLINICAL SUPPORT (OUTPATIENT)
Dept: REHABILITATION | Facility: HOSPITAL | Age: 60
End: 2025-03-28
Payer: COMMERCIAL

## 2025-03-28 DIAGNOSIS — M25.661 DECREASED ROM OF RIGHT KNEE: ICD-10-CM

## 2025-03-28 DIAGNOSIS — Z74.09 DECREASED FUNCTIONAL MOBILITY AND ENDURANCE: ICD-10-CM

## 2025-03-28 DIAGNOSIS — Z96.651 S/P TOTAL KNEE ARTHROPLASTY, RIGHT: Primary | ICD-10-CM

## 2025-03-28 DIAGNOSIS — R29.898 DECREASED STRENGTH INVOLVING KNEE JOINT: ICD-10-CM

## 2025-03-28 PROCEDURE — 97530 THERAPEUTIC ACTIVITIES: CPT | Mod: PN,CQ

## 2025-03-28 PROCEDURE — 97112 NEUROMUSCULAR REEDUCATION: CPT | Mod: PN,CQ

## 2025-03-28 NOTE — PROGRESS NOTES
"  Outpatient Rehab    Physical Therapy Visit    Patient Name: Isamar Osorio  MRN: [id number]  YOB: 1965  Encounter Date: 3/28/2025    Therapy Diagnosis:   Encounter Diagnoses   Name Primary?    S/P total knee arthroplasty, right Yes    Decreased strength involving knee joint     Decreased ROM of right knee     Decreased functional mobility and endurance      Physician: Teo Richard,*    Physician Orders: Eval and Treat  Medical Diagnosis: S/P total knee arthroplasty, right    Visit # / Visits Authorized:  26 / 30  Pta visit# 4   Insurance Authorization Period: 1/24/2025 to 4/17/2025  Date of Evaluation:  1/24/2025     Time In: 925    Time Out:  1015  Total Time:     Total Billable Time:  50    FOTO:  Intake Score:  %  Survey Score 1:  %  Survey Score 2:  %         Subjective   I am having sharp 'catching" type pain that hits right behind knee and runs down the outside of calf, when it hits I cant hardly walk..  Pain reported as 0/10.      Objective       Knee Range of Motion   Right Knee   Active (deg) Passive (deg) Pain   Flexion 126       Extension 2           Quad lag with terminal knee extension -4           Treatment:  Balance/Neuromuscular Re-Education  NMR 1: terminal knee extension with estim 15 x 5#  NMR 2: quad sets with estim x 25 reps  NMR 3: straight leg raise x 8 reps with es  NMR 4: slant board x 2 min  NMR 5: right hs stretch on step x 5  Therapeutic Activity  TA 1: biking x 6 min seat level 0 intensity 2 to promote endurance with community walking  TA 2: stairclimber r le only x 20 to simulate community stair climbing  TA 7: supine flexion with towel x 15 to promote functional flexion for walking  TA 8: r ss leg presses 20 x 55# to simulate bending and squatting  TA 9: r ss toe presses 15 x 55# to simulate reaching on tip toes  TA 10: plyometric ball toss 3 ways with semi bent knee x 15 reps to promote stability with dressing  Modalities  Ultrasound: us to right hs and gastroc " 1.5 w/cm2/ 1mhz x 5' after being cleared of contraindications    Time Entry(in minutes):       Assessment & Plan   Assessment:         Patient will continue to benefit from skilled outpatient physical therapy to address the deficits listed in the problem list box on initial evaluation, provide pt/family education and to maximize pt's level of independence in the home and community environment.     Patient's spiritual, cultural, and educational needs considered and patient agreeable to plan of care and goals.           Goals:   Active       Ambulation/movement          Range of Motion       Patient will achieve right knee ROM of  ext -0  degrees with quad lag  (Progressing)       Start:  01/24/25    Expected End:  03/21/25               Strength       Patient will achieve right knee flexion strength of 3+/5 (Met)       Start:  01/24/25    Expected End:  02/21/25    Resolved:  02/24/25         Patient will achieve right knee extension strength of 3+/5 (Met)       Start:  01/24/25    Expected End:  02/21/25    Resolved:  02/24/25            Strength       Patient will achieve right knee flexion strength of 5/5 (Progressing)       Start:  01/24/25    Expected End:  03/21/25            Patient will achieve right knee extension strength of 5/5 (Progressing)       Start:  01/24/25    Expected End:  03/21/25              Resolved       Ambulation/movement       Patient will walk independent without an assistive device  (Met)       Start:  01/24/25    Expected End:  02/21/25    Resolved:  02/26/25            Pain       Patient will report pain of 4/10 demonstrating a reduction of overall pain (Met)       Start:  01/24/25    Expected End:  02/21/25    Resolved:  02/17/25            Pain       Patient will report pain of 1/10 demonstrating a reduction of overall pain (Met)       Start:  01/24/25    Expected End:  03/21/25    Resolved:  02/24/25            Range of Motion       Patient will achieve right knee ROM of  flexion  -120 degrees  (Met)       Start:  01/24/25    Expected End:  02/21/25    Resolved:  02/24/25         Plan :Planned therapy interventions include: Therapeutic exercise and Therapeutic activities.    Planned modalities to include: Electrical stimulation - passive/unattended.         Visit Frequency: 3 times Per Week for 8 Weeks.    Rajni Bond, PTA

## 2025-03-31 ENCOUNTER — CLINICAL SUPPORT (OUTPATIENT)
Dept: REHABILITATION | Facility: HOSPITAL | Age: 60
End: 2025-03-31
Payer: COMMERCIAL

## 2025-03-31 DIAGNOSIS — R29.898 DECREASED STRENGTH INVOLVING KNEE JOINT: Primary | ICD-10-CM

## 2025-03-31 DIAGNOSIS — Z74.09 DECREASED FUNCTIONAL MOBILITY AND ENDURANCE: ICD-10-CM

## 2025-03-31 DIAGNOSIS — M25.661 DECREASED ROM OF RIGHT KNEE: ICD-10-CM

## 2025-03-31 DIAGNOSIS — Z96.651 S/P TOTAL KNEE ARTHROPLASTY, RIGHT: ICD-10-CM

## 2025-03-31 PROCEDURE — 97112 NEUROMUSCULAR REEDUCATION: CPT | Mod: PN

## 2025-03-31 PROCEDURE — 97530 THERAPEUTIC ACTIVITIES: CPT | Mod: PN

## 2025-03-31 NOTE — PROGRESS NOTES
"  Outpatient Rehab    Physical Therapy Visit    Patient Name: Isamar Osorio  MRN: [id number]  YOB: 1965  Encounter Date: 3/31/2025    Therapy Diagnosis:   Encounter Diagnoses   Name Primary?    Decreased strength involving knee joint Yes    S/P total knee arthroplasty, right     Decreased ROM of right knee     Decreased functional mobility and endurance      Physician: Teo Richard,*    Physician Orders: Eval and Treat  Medical Diagnosis: S/P total knee arthroplasty, right    Visit # / Visits Authorized:  27 / 30  Insurance Authorization Period: 1/24/2025 to 4/17/2025  Date of Evaluation:  1/24/2025      PT/PTA:     Number of PTA visits since last PT visit:   Time In: 0917   Time Out: 1012  Total Time: 55   Total Billable Time:  55    FOTO:  Intake Score:  %  Survey Score 1:  %  Survey Score 2:  %         Subjective   knee is doing okay just tight.  Pain reported as 0/10.      Objective            Treatment:  Manual Therapy  MT 1: prom to right knee x 8 min  Balance/Neuromuscular Re-Education  NMR 1: terminal knee extension with estim 15 x 5#  NMR 2: quad sets with estim x 25 reps  NMR 3: straight leg raise x 8 reps with es  NMR 4: slant board x 2 min  NMR 5: right hs stretch on step x 5  NMR 6: BOSU squats x 10  NMR 7: fred ss balance without ue support x 5  NMR 8: heel-toe walking and retro 2 x 25'  NMR 9: 6" forward step downs onto left le x 10  Therapeutic Activity  TA 1: biking x 6 min seat level 0 intensity 2 to promote endurance with community walking  TA 2: stairclimber r le only x 20 to simulate community stair climbing  TA 3: double support calf pressing with 70lb x 20 reps for toe raising to reach into cabinets  TA 4: total gym leg pressing simulating getting up from kneeling level 10 x 20 reps  TA 5: total gym calf pressing simulaitng toe raising to reach into cabinets x 20 reps with level 10  TA 6: b hip abd, add 10 x 30# to promote pelvic stability with walking  TA 7: supine " flexion with towel x 15 to promote functional flexion for walking  TA 8: r ss leg presses 20 x 55# to simulate bending and squatting  TA 9: r ss toe presses 15 x 55# to simulate reaching on tip toes  TA 10: plyometric ball toss 3 ways with semi bent knee x 15 reps to promote stability with dressing      Assessment & Plan   Assessment: pt improving with decreased tightness       Patient will continue to benefit from skilled outpatient physical therapy to address the deficits listed in the problem list box on initial evaluation, provide pt/family education and to maximize pt's level of independence in the home and community environment.     Patient's spiritual, cultural, and educational needs considered and patient agreeable to plan of care and goals.           Plan: Planned therapy interventions include: Therapeutic exercise and Therapeutic activities.    Planned modalities to include: Electrical stimulation - passive/unattended.  Visit Frequency: 3 times Per Week for 8 Weeks.    Goals:   Active       Ambulation/movement          Range of Motion       Patient will achieve right knee ROM of  ext -0  degrees with quad lag  (Progressing)       Start:  01/24/25    Expected End:  03/21/25               Strength       Patient will achieve right knee flexion strength of 3+/5 (Met)       Start:  01/24/25    Expected End:  02/21/25    Resolved:  02/24/25         Patient will achieve right knee extension strength of 3+/5 (Met)       Start:  01/24/25    Expected End:  02/21/25    Resolved:  02/24/25            Strength       Patient will achieve right knee flexion strength of 5/5 (Progressing)       Start:  01/24/25    Expected End:  03/21/25            Patient will achieve right knee extension strength of 5/5 (Progressing)       Start:  01/24/25    Expected End:  03/21/25              Resolved       Ambulation/movement       Patient will walk independent without an assistive device  (Met)       Start:  01/24/25    Expected  End:  02/21/25    Resolved:  02/26/25            Pain       Patient will report pain of 4/10 demonstrating a reduction of overall pain (Met)       Start:  01/24/25    Expected End:  02/21/25    Resolved:  02/17/25            Pain       Patient will report pain of 1/10 demonstrating a reduction of overall pain (Met)       Start:  01/24/25    Expected End:  03/21/25    Resolved:  02/24/25            Range of Motion       Patient will achieve right knee ROM of  flexion -120 degrees  (Met)       Start:  01/24/25    Expected End:  02/21/25    Resolved:  02/24/25           Updated Certification Period: 3/17/2025 to 4/17/2025   Recommended Treatment Plan: 2 times per week for 8 weeks: Electrical Stimulation nmes, Manual Therapy, Neuromuscular Re-ed, Patient Education, Therapeutic Activities, and Therapeutic Exercise  Other Recommendations: cont        Quad strength and range of motion and balance   Akash Puentes, PT

## 2025-04-02 ENCOUNTER — CLINICAL SUPPORT (OUTPATIENT)
Dept: REHABILITATION | Facility: HOSPITAL | Age: 60
End: 2025-04-02
Payer: COMMERCIAL

## 2025-04-02 DIAGNOSIS — Z96.651 S/P TOTAL KNEE ARTHROPLASTY, RIGHT: ICD-10-CM

## 2025-04-02 DIAGNOSIS — R29.898 DECREASED STRENGTH INVOLVING KNEE JOINT: Primary | ICD-10-CM

## 2025-04-02 DIAGNOSIS — Z74.09 DECREASED FUNCTIONAL MOBILITY AND ENDURANCE: ICD-10-CM

## 2025-04-02 DIAGNOSIS — M25.661 DECREASED ROM OF RIGHT KNEE: ICD-10-CM

## 2025-04-02 PROCEDURE — 97530 THERAPEUTIC ACTIVITIES: CPT | Mod: PN,CQ

## 2025-04-02 PROCEDURE — 97112 NEUROMUSCULAR REEDUCATION: CPT | Mod: PN,CQ

## 2025-04-02 NOTE — PROGRESS NOTES
"  Outpatient Rehab    Physical Therapy Visit    Patient Name: Isamar Osorio  MRN: [id number]  YOB: 1965  Encounter Date: 4/2/2025    Therapy Diagnosis:   Encounter Diagnoses   Name Primary?    Decreased strength involving knee joint Yes    S/P total knee arthroplasty, right     Decreased ROM of right knee     Decreased functional mobility and endurance      Physician: Teo Richard,*    Physician Orders: Eval and Treat  Medical Diagnosis: S/P total knee arthroplasty, right    Visit # / Visits Authorized:  28 / 30  Pta visit# 1  Insurance Authorization Period: 1/24/2025 to 4/17/2025    Time In:   930  Time Out:1015  Total Time:     Total Billable Time: 45     FOTO:  Intake Score:  %  Survey Score 1:  %  Survey Score 2:  %         Subjective   My knee is pooping right in the of knee a lot when I bend it from being straight..  Pain reported as 0/10.      Objective       Knee Range of Motion   Right Knee   Active (deg) Passive (deg) Pain   Flexion 126       Extension 2           Quad lag right -5            Treatment:  Balance/Neuromuscular Re-Education  NMR 2: quad sets with estim x 20 reps  NMR 3: 6"  leg raise 2 x 6 reps with es  NMR 4: slant board x 2 min  NMR 5: right hs stretch on step x 5  NMR 6: BOSU squats x 10  Therapeutic Activity  TA 1: biking x 6 min seat level 0 intensity 2 to promote endurance with community walking  TA 2: stairclimber r le only x 25 to simulate community stair climbing  TA 4: total gym leg pressing simulating getting up from kneeling level 10 x 20 reps  TA 5: total gym calf pressing simulaitng toe raising to reach into cabinets x 20 reps with level 10  TA 7: supine flexion with towel x 10 to promote functional flexion for walking  TA 8: r ss leg presses 20 x 55# to simulate bending and squatting  TA 9: r ss toe presses 15 x 55# to simulate reaching on tip toes    Time Entry(in minutes):       Assessment & Plan   Assessment:         Patient will continue to benefit " from skilled outpatient physical therapy to address the deficits listed in the problem list box on initial evaluation, provide pt/family education and to maximize pt's level of independence in the home and community environment.     Patient's spiritual, cultural, and educational needs considered and patient agreeable to plan of care and goals.           Goals:   Active       Ambulation/movement          Range of Motion       Patient will achieve right knee ROM of  ext -0  degrees with quad lag  (Progressing)       Start:  01/24/25    Expected End:  03/21/25               Strength       Patient will achieve right knee flexion strength of 3+/5 (Met)       Start:  01/24/25    Expected End:  02/21/25    Resolved:  02/24/25         Patient will achieve right knee extension strength of 3+/5 (Met)       Start:  01/24/25    Expected End:  02/21/25    Resolved:  02/24/25            Strength       Patient will achieve right knee flexion strength of 5/5 (Progressing)       Start:  01/24/25    Expected End:  03/21/25            Patient will achieve right knee extension strength of 5/5 (Progressing)       Start:  01/24/25    Expected End:  03/21/25              Resolved       Ambulation/movement       Patient will walk independent without an assistive device  (Met)       Start:  01/24/25    Expected End:  02/21/25    Resolved:  02/26/25            Pain       Patient will report pain of 4/10 demonstrating a reduction of overall pain (Met)       Start:  01/24/25    Expected End:  02/21/25    Resolved:  02/17/25            Pain       Patient will report pain of 1/10 demonstrating a reduction of overall pain (Met)       Start:  01/24/25    Expected End:  03/21/25    Resolved:  02/24/25            Range of Motion       Patient will achieve right knee ROM of  flexion -120 degrees  (Met)       Start:  01/24/25    Expected End:  02/21/25    Resolved:  02/24/25         Plan: Planned therapy interventions include: Therapeutic exercise and  Therapeutic activities.    Planned modalities to include: Electrical stimulation - passive/unattended.  Visit Frequency: 3 times Per Week for 8 Weeks.     Rajni Bond, PTA

## 2025-04-09 ENCOUNTER — CLINICAL SUPPORT (OUTPATIENT)
Dept: REHABILITATION | Facility: HOSPITAL | Age: 60
End: 2025-04-09
Payer: COMMERCIAL

## 2025-04-09 DIAGNOSIS — R29.898 DECREASED STRENGTH INVOLVING KNEE JOINT: Primary | ICD-10-CM

## 2025-04-09 DIAGNOSIS — Z96.651 S/P TOTAL KNEE ARTHROPLASTY, RIGHT: ICD-10-CM

## 2025-04-09 PROCEDURE — 97530 THERAPEUTIC ACTIVITIES: CPT | Mod: PN,CQ

## 2025-04-09 PROCEDURE — 97112 NEUROMUSCULAR REEDUCATION: CPT | Mod: PN,CQ

## 2025-04-09 NOTE — PROGRESS NOTES
"  Outpatient Rehab    Physical Therapy Visit    Patient Name: Isamar Osorio  MRN: [id number]  YOB: 1965  Encounter Date: 4/9/2025    Therapy Diagnosis:   Encounter Diagnoses   Name Primary?    Decreased strength involving knee joint Yes    S/P total knee arthroplasty, right      Physician: Teo Richard,*    Physician Orders: Eval and Treat  Medical Diagnosis: S/P total knee arthroplasty, right    Visit # / Visits Authorized:  29 / 30  Pta visit # 2  Insurance Authorization Period: 1/24/2025 to 4/17/2025    Time In:   930  Time Out:  1015  Total Time:     Total Billable Time:  45    FOTO:  Intake Score:30  %  Survey Score 1:  %  Survey Score 2:69  %         Subjective   I saw dr and he released me..  Pain reported as 0/10.      Objective       Knee Range of Motion   Right Knee   Active (deg) Passive (deg) Pain   Flexion 130       Extension 3           Quad lag right -3           Treatment:  Balance/Neuromuscular Re-Education  NMR 1: terminal knee extension x 15  NMR 2: quad sets  x 20 reps  NMR 3: 6"  leg raise x  Therapeutic Activity  TA 1: biking x 6 min seat level 0 intensity 2 to promote endurance with community walking  TA 2: stairclimber r le only x 25 to simulate community stair climbing  TA 4: total gym leg pressing simulating getting up from kneeling level 10 x 20 reps  TA 5: total gym calf pressing simulaitng toe raising to reach into cabinets x 20 reps with level 10  TA 6: b hip abd, add 10 x 30# to promote pelvic stability with walking  TA 7: supine flexion with towel x 10 to promote functional flexion for walking  TA 8: r ss leg presses 20 x 55# to simulate bending and squatting  TA 9: r ss toe presses 15 x 55# to simulate reaching on tip toes  TA 10: plyometric ball toss 3 ways with semi bent knee x 15 reps to promote stability with dressing    Time Entry(in minutes):       Assessment & Plan   Assessment: pt instructed to be consistent with hep, has progressed well with rx and met " goals outlined in eval       Patient will continue to benefit from skilled outpatient physical therapy to address the deficits listed in the problem list box on initial evaluation, provide pt/family education and to maximize pt's level of independence in the home and community environment.     Patient's spiritual, cultural, and educational needs considered and patient agreeable to plan of care and goals.           Plan:      Goals:   Active       Ambulation/movement          Range of Motion       Patient will achieve right knee ROM of  ext -0  degrees with quad lag  (Progressing)       Start:  01/24/25    Expected End:  03/21/25               Strength       Patient will achieve right knee flexion strength of 3+/5 (Met)       Start:  01/24/25    Expected End:  02/21/25    Resolved:  02/24/25         Patient will achieve right knee extension strength of 3+/5 (Met)       Start:  01/24/25    Expected End:  02/21/25    Resolved:  02/24/25            Strength       Patient will achieve right knee flexion strength of 5/5 (Met)       Start:  01/24/25    Expected End:  03/21/25    Resolved:  04/09/25         Patient will achieve right knee extension strength of 5/5 (Met)       Start:  01/24/25    Expected End:  03/21/25    Resolved:  04/09/25           Resolved       Ambulation/movement       Patient will walk independent without an assistive device  (Met)       Start:  01/24/25    Expected End:  02/21/25    Resolved:  02/26/25            Pain       Patient will report pain of 4/10 demonstrating a reduction of overall pain (Met)       Start:  01/24/25    Expected End:  02/21/25    Resolved:  02/17/25            Pain       Patient will report pain of 1/10 demonstrating a reduction of overall pain (Met)       Start:  01/24/25    Expected End:  03/21/25    Resolved:  02/24/25            Range of Motion       Patient will achieve right knee ROM of  flexion -120 degrees  (Met)       Start:  01/24/25    Expected End:  02/21/25     Resolved:  02/24/25         Plan: will d/c to home exercise program, see physical therapist d/c note    Rajni Bond, PTA

## 2025-04-09 NOTE — PROGRESS NOTES
"   Outpatient Rehab     Physical Therapy Visit     Patient Name: Isamar Osorio  MRN: [id number]  YOB: 1965  Encounter Date: 4/9/2025     Therapy Diagnosis:        Encounter Diagnoses   Name Primary?    Decreased strength involving knee joint Yes    S/P total knee arthroplasty, right        Physician: Teo Richard,*     Physician Orders: Eval and Treat  Medical Diagnosis: S/P total knee arthroplasty, right     Visit # / Visits Authorized:  29 / 30  Pta visit # 2  Insurance Authorization Period: 1/24/2025 to 4/17/2025     Time In:   930  Time Out:  1015  Total Time:     Total Billable Time:  45     FOTO:  Intake Score:30  %  Survey Score 1:  %  Survey Score 2:69  %        Subjective  I saw dr and he released me..  Pain reported as 0/10.       Objective   Knee Range of Motion   Right Knee    Active (deg) Passive (deg) Pain   Flexion 130       Extension 3             Quad lag right -3              Treatment:  Balance/Neuromuscular Re-Education  NMR 1: terminal knee extension x 15  NMR 2: quad sets  x 20 reps  NMR 3: 6"  leg raise x  Therapeutic Activity  TA 1: biking x 6 min seat level 0 intensity 2 to promote endurance with community walking  TA 2: stairclimber r le only x 25 to simulate community stair climbing  TA 4: total gym leg pressing simulating getting up from kneeling level 10 x 20 reps  TA 5: total gym calf pressing simulaitng toe raising to reach into cabinets x 20 reps with level 10  TA 6: b hip abd, add 10 x 30# to promote pelvic stability with walking  TA 7: supine flexion with towel x 10 to promote functional flexion for walking  TA 8: r ss leg presses 20 x 55# to simulate bending and squatting  TA 9: r ss toe presses 15 x 55# to simulate reaching on tip toes  TA 10: plyometric ball toss 3 ways with semi bent knee x 15 reps to promote stability with dressing     Time Entry(in minutes):     Assessment & Plan  Assessment: pt instructed to be consistent with hep, has progressed well " with rx and met goals outlined in eval     Patient will continue to benefit from skilled outpatient physical therapy to address the deficits listed in the problem list box on initial evaluation, provide pt/family education and to maximize pt's level of independence in the home and community environment.      Patient's spiritual, cultural, and educational needs considered and patient agreeable to plan of care and goals.             Plan:       Goals:   Active         Ambulation/movement            Range of Motion         Patient will achieve right knee ROM of  ext -0  degrees with quad lag  (Progressing)         Start:  01/24/25    Expected End:  03/21/25                 Strength         Patient will achieve right knee flexion strength of 3+/5 (Met)         Start:  01/24/25    Expected End:  02/21/25    Resolved:  02/24/25           Patient will achieve right knee extension strength of 3+/5 (Met)         Start:  01/24/25    Expected End:  02/21/25    Resolved:  02/24/25              Strength         Patient will achieve right knee flexion strength of 5/5 (Met)         Start:  01/24/25    Expected End:  03/21/25    Resolved:  04/09/25           Patient will achieve right knee extension strength of 5/5 (Met)         Start:  01/24/25    Expected End:  03/21/25    Resolved:  04/09/25            Resolved         Ambulation/movement         Patient will walk independent without an assistive device  (Met)         Start:  01/24/25    Expected End:  02/21/25    Resolved:  02/26/25              Pain         Patient will report pain of 4/10 demonstrating a reduction of overall pain (Met)         Start:  01/24/25    Expected End:  02/21/25    Resolved:  02/17/25              Pain         Patient will report pain of 1/10 demonstrating a reduction of overall pain (Met)         Start:  01/24/25    Expected End:  03/21/25    Resolved:  02/24/25              Range of Motion         Patient will achieve right knee ROM of  flexion -120  degrees  (Met)         Start:  01/24/25    Expected End:  02/21/25    Resolved:  02/24/25             Outpatient Therapy Discharge Summary     Name: Isamar [facility]CRUGGS [facility] Number: [id number]    Therapy Diagnosis:   Encounter Diagnoses   Name Primary?    Decreased strength involving knee joint Yes    S/P total knee arthroplasty, right      Physician: Teo Richard,*         Assessment    Goals:  Pt met goals     Discharge reason: Patient has met all of his/her goals    Plan   This patient is discharged from Physical Therapy.

## 2025-04-30 ENCOUNTER — OFFICE VISIT (OUTPATIENT)
Dept: FAMILY MEDICINE | Facility: CLINIC | Age: 60
End: 2025-04-30
Payer: COMMERCIAL

## 2025-04-30 VITALS
TEMPERATURE: 99 F | BODY MASS INDEX: 37.03 KG/M2 | RESPIRATION RATE: 18 BRPM | HEART RATE: 85 BPM | HEIGHT: 69 IN | WEIGHT: 250 LBS | DIASTOLIC BLOOD PRESSURE: 92 MMHG | SYSTOLIC BLOOD PRESSURE: 128 MMHG | OXYGEN SATURATION: 96 %

## 2025-04-30 DIAGNOSIS — Z13.1 SCREENING FOR DIABETES MELLITUS: ICD-10-CM

## 2025-04-30 DIAGNOSIS — Z11.4 SCREENING FOR HIV (HUMAN IMMUNODEFICIENCY VIRUS): ICD-10-CM

## 2025-04-30 DIAGNOSIS — R73.09 ELEVATED HEMOGLOBIN A1C: Primary | ICD-10-CM

## 2025-04-30 DIAGNOSIS — Z11.59 ENCOUNTER FOR HEPATITIS C SCREENING TEST FOR LOW RISK PATIENT: ICD-10-CM

## 2025-04-30 LAB
EST. AVERAGE GLUCOSE BLD GHB EST-MCNC: 140 MG/DL
HBA1C MFR BLD HPLC: 6.5 %
HCV AB SER QL: NORMAL
HIV 1+O+2 AB SERPL QL: NORMAL

## 2025-04-30 PROCEDURE — 3008F BODY MASS INDEX DOCD: CPT | Mod: CPTII,,,

## 2025-04-30 PROCEDURE — 4010F ACE/ARB THERAPY RXD/TAKEN: CPT | Mod: CPTII,,,

## 2025-04-30 PROCEDURE — 3074F SYST BP LT 130 MM HG: CPT | Mod: CPTII,,,

## 2025-04-30 PROCEDURE — 3044F HG A1C LEVEL LT 7.0%: CPT | Mod: CPTII,,,

## 2025-04-30 PROCEDURE — 99213 OFFICE O/P EST LOW 20 MIN: CPT | Mod: ,,,

## 2025-04-30 PROCEDURE — 87389 HIV-1 AG W/HIV-1&-2 AB AG IA: CPT | Mod: ,,, | Performed by: CLINICAL MEDICAL LABORATORY

## 2025-04-30 PROCEDURE — 1160F RVW MEDS BY RX/DR IN RCRD: CPT | Mod: CPTII,,,

## 2025-04-30 PROCEDURE — 86803 HEPATITIS C AB TEST: CPT | Mod: ,,, | Performed by: CLINICAL MEDICAL LABORATORY

## 2025-04-30 PROCEDURE — 1159F MED LIST DOCD IN RCRD: CPT | Mod: CPTII,,,

## 2025-04-30 PROCEDURE — 83036 HEMOGLOBIN GLYCOSYLATED A1C: CPT | Mod: ,,, | Performed by: CLINICAL MEDICAL LABORATORY

## 2025-04-30 PROCEDURE — 3080F DIAST BP >= 90 MM HG: CPT | Mod: CPTII,,,

## 2025-04-30 RX ORDER — CELECOXIB 200 MG/1
200 CAPSULE ORAL
COMMUNITY
Start: 2025-02-27

## 2025-04-30 NOTE — ASSESSMENT & PLAN NOTE
Lab Results   Component Value Date    HGBA1C 6.4 01/03/2025      Will recheck today. Discussed medication options if still elevated. Will call with lab work and any new orders tomorrow. Also discussed diet and exercise. Pt is agreeable and voiced understanding.

## 2025-04-30 NOTE — PROGRESS NOTES
COREY CHARLES, KELLY   [facility]  [address] [address] [place]  [address]        PATIENT NAME: Isamar Osorio  : 1965  DATE: 25  MRN: [id number]        Reason for Visit / Chief Complaint: Hyperglycemia (Patient is a 59 year old female who presents to the clinic related elevated glucose at last OBGYN appointment.  Dr. Magdalene [facility] in [place].  Patient was told to wait until after she recovered from her right knee replacement to have it rechecked.  Surgery was 01/15/2025/Dr. Richard. Patient is requesting a fasting glucose or A1C today.  Last A1C we have on chart was a 6.4.  Patient reports she did have a piece of candy at 1230am ) and Health Maintenance (Hepatitis C Screening ordered/HIV Screening ordered/TETANUS VACCINE Never done--declines/Colorectal Cancer Screening --scheduled for 2025 [facility]/Shingles Vaccine(1 of 2) Never done-referred to pharmacy /Pneumococcal Vaccines (Age 50+)(1 of 1 - PCV) Never done--declines/RSV Vaccine (Age 60+ and Pregnant patients)(1 - Risk 60-74 years 1-dose series) referred to pharm/Influenza Vaccine(1) declines /COVID-19 Vaccine(3 -  season) declines ////)       Update PCP  Update Chief Complaint         History of Present Illness / Problem Focused Workflow     Isamar Osorio presents to the clinic with Hyperglycemia (Patient is a 59 year old female who presents to the clinic related elevated glucose at last OBGYN appointment.  Dr. Magdalene Peacock [facility] in [place].  Patient was told to wait until after she recovered from her right knee replacement to have it rechecked.  Surgery was 01/15/2025/Dr. Richard. Patient is requesting a fasting glucose or A1C today.  Last A1C we have on chart was a 6.4.  Patient reports she did have a piece of candy at 1230am ) and Health Maintenance (Hepatitis C Screening ordered/HIV Screening ordered/TETANUS VACCINE Never done--declines/Colorectal Cancer Screening  --scheduled for 2025 [facility] Lina[facility]/Shingles Vaccine(1 of 2) Never done-referred to pharmacy /Pneumococcal Vaccines (Age 50+)(1 of 1 - PCV) Never done--declines/RSV Vaccine (Age 60+ and Pregnant patients)(1 - Risk 60-74 years 1-dose series) referred to pharm/Influenza Vaccine(1) declines /COVID-19 Vaccine(3 -  season) declines ////)         Review of Systems     Review of Systems   Constitutional:  Negative for chills, fatigue and fever.   HENT:  Negative for nasal congestion, ear discharge, ear pain, rhinorrhea, sinus pressure/congestion and sore throat.    Respiratory:  Negative for cough, chest tightness, shortness of breath, wheezing and stridor.    Cardiovascular:  Negative for palpitations and claudication.   Gastrointestinal:  Negative for abdominal pain, constipation, diarrhea, nausea, vomiting and reflux.   Genitourinary:  Negative for dysuria, flank pain, frequency, hematuria and urgency.   Musculoskeletal:  Negative for myalgias.   Neurological:  Negative for dizziness, weakness, light-headedness and headaches.   Psychiatric/Behavioral:  Negative for suicidal ideas.         Medical / Social / Family History     Past Medical History:   Diagnosis Date    Hyperlipidemia     Hypertension        Past Surgical History:   Procedure Laterality Date    ANKLE SURGERY Left      SECTION      CHOLECYSTECTOMY      PARTIAL HYSTERECTOMY      right knee replacement  Right 01/15/2025    Dr. Richard       Social History  Ms.  reports that she has never smoked. She has never been exposed to tobacco smoke. She has never used smokeless tobacco. She reports that she does not drink alcohol and does not use drugs.    Family History  Ms.'s family history includes Kidney failure in her maternal aunt and maternal uncle; No Known Problems in her father and mother.    Medications and Allergies     Medications  Outpatient Medications Marked as Taking for the 25 encounter (Office Visit)  "with Sanjiv Rodríguez FNP   Medication Sig Dispense Refill    celecoxib (CELEBREX) 200 MG capsule Take 200 mg by mouth. As needed related to swelling      ergocalciferol (ERGOCALCIFEROL) 50,000 unit Cap Take 1 capsule (50,000 Units total) by mouth every 7 days. 12 capsule 1    estradioL (ESTRACE) 0.01 % (0.1 mg/gram) vaginal cream Place vaginally every other day.      pantoprazole (PROTONIX) 40 MG tablet Take 40 mg by mouth once daily.      rosuvastatin (CRESTOR) 20 MG tablet Take 20 mg by mouth every evening.      telmisartan (MICARDIS) 20 MG Tab Take 1 tablet (20 mg total) by mouth once daily. 30 tablet 1       Allergies  Review of patient's allergies indicates:  No Known Allergies    Physical Examination   BP (!) 128/92 (BP Location: Right arm, Patient Position: Sitting)   Pulse 85   Temp 98.5 °F (36.9 °C) (Oral)   Resp 18   Ht 5' 8.5" (1.74 m)   Wt 113.4 kg (250 lb)   LMP  (LMP Unknown)   SpO2 96%   BMI 37.46 kg/m²    Physical Exam  Vitals and nursing note reviewed.   Constitutional:       General: She is awake.      Appearance: Normal appearance. She is obese.   HENT:      Head: Normocephalic.      Right Ear: Tympanic membrane, ear canal and external ear normal.      Left Ear: Tympanic membrane, ear canal and external ear normal.      Nose: Nose normal.      Mouth/Throat:      Lips: Pink.      Mouth: Mucous membranes are moist.      Pharynx: Oropharynx is clear. Uvula midline.   Cardiovascular:      Rate and Rhythm: Normal rate and regular rhythm.      Heart sounds: Normal heart sounds, S1 normal and S2 normal.   Pulmonary:      Effort: Pulmonary effort is normal. No respiratory distress.      Breath sounds: Normal breath sounds. No decreased breath sounds, wheezing, rhonchi or rales.   Abdominal:      General: Bowel sounds are normal.      Palpations: Abdomen is soft.      Tenderness: There is no abdominal tenderness.   Musculoskeletal:      Cervical back: Normal range of motion.   Skin:     General: " Skin is warm.      Capillary Refill: Capillary refill takes less than 2 seconds.   Neurological:      Mental Status: She is alert and oriented to person, place, and time.   Psychiatric:         Thought Content: Thought content does not include homicidal or suicidal ideation. Thought content does not include homicidal or suicidal plan.          Assessment and Plan (including Health Maintenance)      Problem List  Smart Sets  Document Outside HM   :        Health Maintenance Due   Topic Date Due    Hepatitis C Screening  Never done    HIV Screening  Never done    TETANUS VACCINE  Never done    Colorectal Cancer Screening  Never done    Shingles Vaccine (1 of 2) Never done    Pneumococcal Vaccines (Age 50+) (1 of 1 - PCV) Never done    RSV Vaccine (Age 60+ and Pregnant patients) (1 - Risk 60-74 years 1-dose series) Never done    Influenza Vaccine (1) 09/01/2024    COVID-19 Vaccine (3 - 2024-25 season) 09/01/2024       Problem List Items Addressed This Visit       Elevated hemoglobin A1c - Primary    Current Assessment & Plan   Lab Results   Component Value Date    HGBA1C 6.4 01/03/2025      Will recheck today. Discussed medication options if still elevated. Will call with lab work and any new orders tomorrow. Also discussed diet and exercise. Pt is agreeable and voiced understanding.          Other Visit Diagnoses         Screening for diabetes mellitus        Relevant Orders    Hemoglobin A1C      Encounter for hepatitis C screening test for low risk patient        Relevant Orders    Hepatitis C Antibody      Screening for HIV (human immunodeficiency virus)        Relevant Orders    HIV 1/2 Ag/Ab (4th Gen)            Health Maintenance Topics with due status: Not Due       Topic Last Completion Date    Cervical Cancer Screening 08/25/2022    Mammogram 10/18/2024    Lipid Panel 12/20/2024    Hemoglobin A1c (Prediabetes) 01/03/2025       No future appointments.         Signature:      KELLY DE JESUS  Family Medicine  [address] [place] [address], [place]  [address]       Date of encounter: 4/30/25

## 2025-05-01 ENCOUNTER — TELEPHONE (OUTPATIENT)
Dept: FAMILY MEDICINE | Facility: CLINIC | Age: 60
End: 2025-05-01
Payer: COMMERCIAL

## 2025-05-01 ENCOUNTER — RESULTS FOLLOW-UP (OUTPATIENT)
Dept: FAMILY MEDICINE | Facility: CLINIC | Age: 60
End: 2025-05-01
Payer: COMMERCIAL

## 2025-05-01 DIAGNOSIS — E11.65 TYPE 2 DIABETES MELLITUS WITH HYPERGLYCEMIA, WITHOUT LONG-TERM CURRENT USE OF INSULIN: Primary | ICD-10-CM

## 2025-05-01 RX ORDER — METFORMIN HYDROCHLORIDE 500 MG/1
500 TABLET ORAL 2 TIMES DAILY WITH MEALS
Qty: 180 TABLET | Refills: 1 | Status: SHIPPED | OUTPATIENT
Start: 2025-05-01 | End: 2026-05-01

## 2025-05-01 NOTE — TELEPHONE ENCOUNTER
Attempted to call patient to review lab results, no answer, left message to return our call.  A1C is 6.5. It was 6.4 3 months ago. I will send in Metformin to try for a month. Make follow up appointment for 1 month. After that we can try Ozempic or Mounjaro if she wants. We discussed diet and exercise at visit yesterday.     Patient returned call, reviewed lab results as stated above, transferred to Shannan to make 1 month follow up appointment.

## 2025-05-18 ENCOUNTER — PATIENT MESSAGE (OUTPATIENT)
Dept: FAMILY MEDICINE | Facility: CLINIC | Age: 60
End: 2025-05-18
Payer: COMMERCIAL

## 2025-05-19 LAB — CRC RECOMMENDATION EXT: NORMAL

## 2025-05-30 ENCOUNTER — PATIENT OUTREACH (OUTPATIENT)
Facility: HOSPITAL | Age: 60
End: 2025-05-30
Payer: COMMERCIAL

## 2025-06-02 ENCOUNTER — OFFICE VISIT (OUTPATIENT)
Dept: FAMILY MEDICINE | Facility: CLINIC | Age: 60
End: 2025-06-02
Payer: COMMERCIAL

## 2025-06-02 ENCOUNTER — TELEPHONE (OUTPATIENT)
Dept: FAMILY MEDICINE | Facility: CLINIC | Age: 60
End: 2025-06-02
Payer: COMMERCIAL

## 2025-06-02 ENCOUNTER — PATIENT OUTREACH (OUTPATIENT)
Facility: HOSPITAL | Age: 60
End: 2025-06-02
Payer: COMMERCIAL

## 2025-06-02 VITALS
HEIGHT: 69 IN | DIASTOLIC BLOOD PRESSURE: 88 MMHG | SYSTOLIC BLOOD PRESSURE: 126 MMHG | OXYGEN SATURATION: 97 % | TEMPERATURE: 98 F | BODY MASS INDEX: 37.15 KG/M2 | RESPIRATION RATE: 18 BRPM | WEIGHT: 250.81 LBS | HEART RATE: 96 BPM

## 2025-06-02 DIAGNOSIS — R73.09 ELEVATED HEMOGLOBIN A1C: Primary | ICD-10-CM

## 2025-06-02 PROCEDURE — 99212 OFFICE O/P EST SF 10 MIN: CPT | Mod: ,,,

## 2025-06-02 PROCEDURE — 3044F HG A1C LEVEL LT 7.0%: CPT | Mod: CPTII,,,

## 2025-06-02 PROCEDURE — 3079F DIAST BP 80-89 MM HG: CPT | Mod: CPTII,,,

## 2025-06-02 PROCEDURE — 3008F BODY MASS INDEX DOCD: CPT | Mod: CPTII,,,

## 2025-06-02 PROCEDURE — 1160F RVW MEDS BY RX/DR IN RCRD: CPT | Mod: CPTII,,,

## 2025-06-02 PROCEDURE — 3074F SYST BP LT 130 MM HG: CPT | Mod: CPTII,,,

## 2025-06-02 PROCEDURE — 1159F MED LIST DOCD IN RCRD: CPT | Mod: CPTII,,,

## 2025-06-02 PROCEDURE — 4010F ACE/ARB THERAPY RXD/TAKEN: CPT | Mod: CPTII,,,

## 2025-06-02 NOTE — Clinical Note
Patient reports she got her colonoscopy results today form [facility] in [place], she will come back in 5 years instead of 3 years, our report says 3 years.  Please get confirmation report

## 2025-06-03 ENCOUNTER — TELEPHONE (OUTPATIENT)
Dept: FAMILY MEDICINE | Facility: CLINIC | Age: 60
End: 2025-06-03
Payer: COMMERCIAL

## 2025-06-03 DIAGNOSIS — E11.65 TYPE 2 DIABETES MELLITUS WITH HYPERGLYCEMIA, WITHOUT LONG-TERM CURRENT USE OF INSULIN: Primary | ICD-10-CM

## 2025-06-03 RX ORDER — SEMAGLUTIDE 0.68 MG/ML
0.5 INJECTION, SOLUTION SUBCUTANEOUS
Qty: 2 EACH | Refills: 0 | Status: SHIPPED | OUTPATIENT
Start: 2025-06-03

## 2025-06-18 NOTE — ASSESSMENT & PLAN NOTE
Lab Results   Component Value Date    HGBA1C 6.5 04/30/2025    HgbA1C obtained at today's visit. Goal is less than 7. Continue current meds and low carb/no concentrated sweets diet with increased exercise as tolerated. Will follow up with lab results. Patient to follow up in 3 months or as needed. Discussed Mounjaro or Ozempic since stopping Metformin. Pt will discuss with  tonight and will discuss tomorrow when call with lab results.

## 2025-06-18 NOTE — PROGRESS NOTES
COREY CHARLES, KELLY   [facility]  [address]  [place]  [address]        PATIENT NAME: Isamar Osorio  : 1965  DATE: 25  MRN: [id number]        Reason for Visit / Chief Complaint: Follow-up (Patient is a 59 year old female who presents to the clinic related to 1 month follow  up related diabetes.  Patient started Metformin x 1 month ago, has taken one daily on some days due to schedule being off, also had to stop taking for colonoscopy, and has been having to take celebrex which interacts with Metformin,  note to discuss mounjaro or ozempic. Patient has had a colonoscopy on 25, 4 polyps removed ([facility] in [place]) changed to 5 year follow up today instead of 3 since benign )       Update PCP  Update Chief Complaint         History of Present Illness / Problem Focused Workflow     Isamar Osorio presents to the clinic with Follow-up (Patient is a 59 year old female who presents to the clinic related to 1 month follow  up related diabetes.  Patient started Metformin x 1 month ago, has taken one daily on some days due to schedule being off, also had to stop taking for colonoscopy, and has been having to take celebrex which interacts with Metformin,  note to discuss mounjaro or ozempic. Patient has had a colonoscopy on 25, 4 polyps removed ([facility] in [place]) changed to 5 year follow up today instead of 3 since benign )     58 y/o female presents to clinic for follow up on elevated A1C. Pt states she has stopped Metformin due to starting back on her Celebrex. Discussed Ozempic or Mounjaro options. She wants to discuss with her  and call us back tomorrow. Pt states she has been trying to watch diet and increase exercise as instructed. She has had recent knee replacement in May.         Review of Systems     Review of Systems   Constitutional:  Negative for chills, fatigue and fever.   HENT:  Negative for nasal congestion, ear discharge, ear pain, rhinorrhea,  sinus pressure/congestion and sore throat.    Respiratory:  Negative for cough, chest tightness, shortness of breath, wheezing and stridor.    Cardiovascular:  Negative for palpitations and claudication.   Gastrointestinal:  Negative for abdominal pain, constipation, diarrhea, nausea, vomiting and reflux.   Genitourinary:  Negative for dysuria, flank pain, frequency, hematuria and urgency.   Musculoskeletal:  Negative for myalgias.   Neurological:  Negative for dizziness, weakness, light-headedness and headaches.   Psychiatric/Behavioral:  Negative for suicidal ideas.         Medical / Social / Family History     Past Medical History:   Diagnosis Date    History of colonic polyps     Dr. Amberly Linares[facility] [place]    Hyperlipidemia     Hypertension        Past Surgical History:   Procedure Laterality Date    ANKLE SURGERY Left      SECTION      CHOLECYSTECTOMY      PARTIAL HYSTERECTOMY      right knee replacement  Right 01/15/2025    Dr. Richard       Social History  Ms.  reports that she has never smoked. She has never been exposed to tobacco smoke. She has never used smokeless tobacco. She reports that she does not drink alcohol and does not use drugs.    Family History  Ms.'s family history includes Kidney failure in her maternal aunt and maternal uncle; No Known Problems in her father and mother.    Medications and Allergies     Medications  Outpatient Medications Marked as Taking for the 25 encounter (Office Visit) with Sanjiv Rodríguez FNP   Medication Sig Dispense Refill    celecoxib (CELEBREX) 200 MG capsule Take 200 mg by mouth. As needed related to swelling      ergocalciferol (ERGOCALCIFEROL) 50,000 unit Cap Take 1 capsule (50,000 Units total) by mouth every 7 days. 12 capsule 1    estradioL (ESTRACE) 0.01 % (0.1 mg/gram) vaginal cream Place vaginally every other day.      metFORMIN (GLUCOPHAGE) 500 MG tablet Take 1 tablet (500 mg total) by mouth 2 (two) times daily with  "meals. 180 tablet 1    pantoprazole (PROTONIX) 40 MG tablet Take 40 mg by mouth once daily.      rosuvastatin (CRESTOR) 20 MG tablet Take 20 mg by mouth every evening.      telmisartan (MICARDIS) 20 MG Tab Take 1 tablet (20 mg total) by mouth once daily. 30 tablet 1       Allergies  Review of patient's allergies indicates:   Allergen Reactions    Adhesive tape-silicones Rash       Physical Examination   /88 (BP Location: Left arm, Patient Position: Sitting)   Pulse 96   Temp 97.9 °F (36.6 °C) (Oral)   Resp 18   Ht 5' 8.5" (1.74 m)   Wt 113.8 kg (250 lb 12.8 oz)   LMP  (LMP Unknown)   SpO2 97%   BMI 37.58 kg/m²    Physical Exam  Vitals and nursing note reviewed.   Constitutional:       General: She is awake.      Appearance: Normal appearance. She is obese.   HENT:      Head: Normocephalic.      Right Ear: Tympanic membrane, ear canal and external ear normal.      Left Ear: Tympanic membrane, ear canal and external ear normal.      Nose: Nose normal.      Mouth/Throat:      Lips: Pink.      Mouth: Mucous membranes are moist.      Pharynx: Oropharynx is clear. Uvula midline.   Cardiovascular:      Rate and Rhythm: Normal rate and regular rhythm.      Heart sounds: Normal heart sounds, S1 normal and S2 normal.   Pulmonary:      Effort: Pulmonary effort is normal. No respiratory distress.      Breath sounds: Normal breath sounds. No decreased breath sounds, wheezing, rhonchi or rales.   Abdominal:      General: Bowel sounds are normal.      Palpations: Abdomen is soft.      Tenderness: There is no abdominal tenderness.   Musculoskeletal:      Cervical back: Normal range of motion.   Skin:     General: Skin is warm.      Capillary Refill: Capillary refill takes less than 2 seconds.   Neurological:      Mental Status: She is alert and oriented to person, place, and time.   Psychiatric:         Thought Content: Thought content does not include homicidal or suicidal ideation. Thought content does not include " homicidal or suicidal plan.          Assessment and Plan (including Health Maintenance)      Problem List  Smart Sets  Document Outside HM   :        Health Maintenance Due   Topic Date Due    TETANUS VACCINE  Never done    Shingles Vaccine (1 of 2) Never done    Pneumococcal Vaccines (Age 50+) (1 of 1 - PCV) Never done    RSV Vaccine (Age 60+ and Pregnant patients) (1 - Risk 60-74 years 1-dose series) Never done    COVID-19 Vaccine (3 - 2024-25 season) 09/01/2024    Cervical Cancer Screening  08/25/2025       Problem List Items Addressed This Visit       Elevated hemoglobin A1c - Primary    Current Assessment & Plan   Lab Results   Component Value Date    HGBA1C 6.5 04/30/2025    HgbA1C obtained at today's visit. Goal is less than 7. Continue current meds and low carb/no concentrated sweets diet with increased exercise as tolerated. Will follow up with lab results. Patient to follow up in 3 months or as needed. Discussed Mounjaro or Ozempic since stopping Metformin. Pt will discuss with  tonight and will discuss tomorrow when call with lab results.            Health Maintenance Topics with due status: Not Due       Topic Last Completion Date    Influenza Vaccine 12/15/2022    Mammogram 10/18/2024    Lipid Panel 12/20/2024    Hemoglobin A1c (Diabetic Prevention Screening) 04/30/2025    Colorectal Cancer Screening 05/19/2025       No future appointments.         Signature:      KELLY DE JESUS   [facility]  [address] [place] [address], [place]  [address]       Date of encounter: 6/2/25

## 2025-06-24 ENCOUNTER — PATIENT MESSAGE (OUTPATIENT)
Dept: FAMILY MEDICINE | Facility: CLINIC | Age: 60
End: 2025-06-24
Payer: COMMERCIAL

## 2025-06-25 ENCOUNTER — TELEPHONE (OUTPATIENT)
Dept: FAMILY MEDICINE | Facility: CLINIC | Age: 60
End: 2025-06-25
Payer: COMMERCIAL

## 2025-06-25 DIAGNOSIS — E11.65 TYPE 2 DIABETES MELLITUS WITH HYPERGLYCEMIA, WITHOUT LONG-TERM CURRENT USE OF INSULIN: ICD-10-CM

## 2025-06-25 RX ORDER — SEMAGLUTIDE 0.68 MG/ML
0.5 INJECTION, SOLUTION SUBCUTANEOUS
Qty: 2 EACH | Refills: 0 | Status: SHIPPED | OUTPATIENT
Start: 2025-06-25

## 2025-06-25 RX ORDER — INSULIN PUMP SYRINGE, 3 ML
EACH MISCELLANEOUS
Qty: 1 EACH | Refills: 0 | Status: SHIPPED | OUTPATIENT
Start: 2025-06-25 | End: 2026-06-25

## 2025-06-25 NOTE — TELEPHONE ENCOUNTER
Patient is complaining of mild constipation since starting on Ozempic. She has some Miralax and Colace at home she used previously after surgery. Instructed patient she may take these OTC meds to help with constipation. Increase water intake.Patient educated on side effects of medications and to adjust OTC medications if they begin to cause diarrhea. Patient voiced understanding. She was scheduled for 1 month follow up to discuss effectiveness of Ozempic and for refills.

## 2025-07-01 ENCOUNTER — PATIENT MESSAGE (OUTPATIENT)
Dept: FAMILY MEDICINE | Facility: CLINIC | Age: 60
End: 2025-07-01
Payer: COMMERCIAL

## 2025-07-09 ENCOUNTER — OFFICE VISIT (OUTPATIENT)
Dept: FAMILY MEDICINE | Facility: CLINIC | Age: 60
End: 2025-07-09
Payer: COMMERCIAL

## 2025-07-09 VITALS
HEIGHT: 69 IN | DIASTOLIC BLOOD PRESSURE: 86 MMHG | WEIGHT: 242.63 LBS | SYSTOLIC BLOOD PRESSURE: 124 MMHG | OXYGEN SATURATION: 97 % | BODY MASS INDEX: 35.94 KG/M2 | RESPIRATION RATE: 20 BRPM | TEMPERATURE: 98 F | HEART RATE: 88 BPM

## 2025-07-09 DIAGNOSIS — Z13.1 SCREENING FOR DIABETES MELLITUS: ICD-10-CM

## 2025-07-09 DIAGNOSIS — E11.65 TYPE 2 DIABETES MELLITUS WITH HYPERGLYCEMIA, WITHOUT LONG-TERM CURRENT USE OF INSULIN: Primary | ICD-10-CM

## 2025-07-09 DIAGNOSIS — Z13.29 SCREENING FOR THYROID DISORDER: ICD-10-CM

## 2025-07-09 LAB
T4 FREE SERPL-MCNC: 0.96 NG/DL (ref 0.7–1.48)
TSH SERPL DL<=0.005 MIU/L-ACNC: 1.02 UIU/ML (ref 0.35–4.94)

## 2025-07-09 PROCEDURE — 3079F DIAST BP 80-89 MM HG: CPT | Mod: CPTII,,,

## 2025-07-09 PROCEDURE — 3044F HG A1C LEVEL LT 7.0%: CPT | Mod: CPTII,,,

## 2025-07-09 PROCEDURE — 1160F RVW MEDS BY RX/DR IN RCRD: CPT | Mod: CPTII,,,

## 2025-07-09 PROCEDURE — 99213 OFFICE O/P EST LOW 20 MIN: CPT | Mod: ,,,

## 2025-07-09 PROCEDURE — 84439 ASSAY OF FREE THYROXINE: CPT | Mod: ,,, | Performed by: CLINICAL MEDICAL LABORATORY

## 2025-07-09 PROCEDURE — 84443 ASSAY THYROID STIM HORMONE: CPT | Mod: ,,, | Performed by: CLINICAL MEDICAL LABORATORY

## 2025-07-09 PROCEDURE — 4010F ACE/ARB THERAPY RXD/TAKEN: CPT | Mod: CPTII,,,

## 2025-07-09 PROCEDURE — 1159F MED LIST DOCD IN RCRD: CPT | Mod: CPTII,,,

## 2025-07-09 PROCEDURE — 3074F SYST BP LT 130 MM HG: CPT | Mod: CPTII,,,

## 2025-07-09 PROCEDURE — 3008F BODY MASS INDEX DOCD: CPT | Mod: CPTII,,,

## 2025-07-09 RX ORDER — SEMAGLUTIDE 0.68 MG/ML
0.5 INJECTION, SOLUTION SUBCUTANEOUS
Qty: 2 EACH | Refills: 0 | Status: SHIPPED | OUTPATIENT
Start: 2025-07-09

## 2025-07-09 NOTE — PROGRESS NOTES
COREY CHARLES, KELLY   [facility]  [address]  [place]  [address]        PATIENT NAME: Isamar Osorio  : 1965  DATE: 25  MRN: [id number]        Reason for Visit / Chief Complaint: Follow-up (Patient is a 60 year old female who presents to the clinic related to 1 month follow up for newly diagnosed diabetes.  Patient started Ozempic, she is tolerating well with no complications.  Lost 8lbs since last visit. ) and Health Maintenance (TETANUS VACCINE Never done/Shingles Vaccine(1 of 2) Never done/Pneumococcal Vaccines (Age 50+)(1 of 1 - PCV) Never done/RSV Vaccine (Age 60+ and Pregnant patients)(1 - Risk 60-74 years 1-dose series) Never done/COVID-19 Vaccine(3 - - season) due on 2024/Cervical Cancer Screening due on 2025//Patient declines vaccines at this time. /)       Update PCP  Update Chief Complaint         History of Present Illness / Problem Focused Workflow        61 y/o female presents to clinic for follow up diabetes. Pt is doing well with Ozempic. Denies any side effects or complaints. Pt has lost 8 lbs since starting. She is watching diet. States she has been doing well.  She is complaining of increased mild fatigue and thinning hair. She is contributing this to increased stress at home due to knee surgery and mother falling and having to have hip replacement. Family hx of thyroid disease.         Review of Systems     Review of Systems   Constitutional:  Negative for chills, fatigue and fever.   HENT:  Negative for nasal congestion, ear discharge, ear pain, rhinorrhea, sinus pressure/congestion and sore throat.    Respiratory:  Negative for cough, chest tightness, shortness of breath, wheezing and stridor.    Cardiovascular:  Negative for palpitations and claudication.   Gastrointestinal:  Negative for abdominal pain, constipation, diarrhea, nausea, vomiting and reflux.   Genitourinary:  Negative for dysuria, flank pain, frequency, hematuria and urgency.    Musculoskeletal:  Negative for myalgias.   Integumentary:         Hair thinning   Neurological:  Negative for dizziness, weakness, light-headedness and headaches.   Psychiatric/Behavioral:  Negative for suicidal ideas.         Medical / Social / Family History     Past Medical History:   Diagnosis Date    History of colonic polyps     Dr. Amberly Linares/[facility]    Hyperlipidemia     Hypertension        Past Surgical History:   Procedure Laterality Date    ANKLE SURGERY Left      SECTION      CHOLECYSTECTOMY      PARTIAL HYSTERECTOMY      right knee replacement  Right 01/15/2025    Dr. Richard       Social History  Ms.  reports that she has never smoked. She has never been exposed to tobacco smoke. She has never used smokeless tobacco. She reports that she does not drink alcohol and does not use drugs.    Family History  Ms.'s family history includes Kidney failure in her maternal aunt and maternal uncle; No Known Problems in her father and mother.    Medications and Allergies     Medications  Outpatient Medications Marked as Taking for the 25 encounter (Office Visit) with Sanjiv Rodríguez FNP   Medication Sig Dispense Refill    blood sugar diagnostic (BLOOD GLUCOSE TEST) Strp 1 each by Misc.(Non-Drug; Combo Route) route 4 (four) times daily before meals and nightly. 200 each 0    blood-glucose meter (BLOOD GLUCOSE MONITORING) kit Use as instructed 1 each 0    ergocalciferol (ERGOCALCIFEROL) 50,000 unit Cap Take 1 capsule (50,000 Units total) by mouth every 7 days. 12 capsule 1    estradioL (ESTRACE) 0.01 % (0.1 mg/gram) vaginal cream Place vaginally every other day.      famotidine (PEPCID) 40 MG tablet Take 20 mg by mouth.      pantoprazole (PROTONIX) 40 MG tablet Take 40 mg by mouth once daily.      rosuvastatin (CRESTOR) 20 MG tablet Take 20 mg by mouth every evening.      telmisartan (MICARDIS) 20 MG Tab Take 1 tablet (20 mg total) by mouth once daily. 30 tablet 1     "[DISCONTINUED] semaglutide (OZEMPIC) 0.25 mg or 0.5 mg (2 mg/3 mL) pen injector Inject 0.5 mg into the skin every 7 days. 2 each 0       Allergies  Review of patient's allergies indicates:   Allergen Reactions    Adhesive tape-silicones Rash       Physical Examination   /86 (BP Location: Right arm, Patient Position: Sitting)   Pulse 88   Temp 97.6 °F (36.4 °C) (Oral)   Resp 20   Ht 5' 8.5" (1.74 m)   Wt 110 kg (242 lb 9.6 oz)   LMP  (LMP Unknown)   SpO2 97%   BMI 36.35 kg/m²    Physical Exam  Vitals and nursing note reviewed.   Constitutional:       General: She is awake.      Appearance: Normal appearance. She is obese.   HENT:      Head: Normocephalic.      Right Ear: Tympanic membrane, ear canal and external ear normal.      Left Ear: Tympanic membrane, ear canal and external ear normal.      Nose: Nose normal.      Mouth/Throat:      Lips: Pink.      Mouth: Mucous membranes are moist.      Pharynx: Oropharynx is clear. Uvula midline.   Cardiovascular:      Rate and Rhythm: Normal rate and regular rhythm.      Heart sounds: Normal heart sounds, S1 normal and S2 normal. Heart sounds not distant. No murmur heard.     No friction rub. No gallop. No S3 or S4 sounds.   Pulmonary:      Effort: Pulmonary effort is normal. No respiratory distress.      Breath sounds: Normal breath sounds. No decreased breath sounds, wheezing, rhonchi or rales.   Abdominal:      General: Bowel sounds are normal.      Palpations: Abdomen is soft.      Tenderness: There is no abdominal tenderness.   Musculoskeletal:      Cervical back: Normal range of motion.      Right lower leg: No edema.      Left lower leg: No edema.   Skin:     General: Skin is warm.      Capillary Refill: Capillary refill takes less than 2 seconds.   Neurological:      Mental Status: She is alert and oriented to person, place, and time.   Psychiatric:         Thought Content: Thought content does not include homicidal or suicidal ideation. Thought " content does not include homicidal or suicidal plan.          Assessment and Plan (including Health Maintenance)      Problem List  Smart Sets  Document Outside HM   :    Plan: RTC first week of August for A1C test  Refill Ozempic today  Will call with thyroid results and any new orders  Follow up 3 months  RTC sooner if needed        Problem List Items Addressed This Visit       Type 2 diabetes mellitus with hyperglycemia, without long-term current use of insulin - Primary    Current Assessment & Plan   Lab Results   Component Value Date    HGBA1C 6.5 04/30/2025    States she has been doing well with current dose of Ozempic. No side effects. Pt has lost 8lbs since starting. Fasting blood sugars running 110-115 at home. Pt is watching diet. Not exercising yet due to recent knee surgery.     Continue current dose of ozempic. Pt will come back first week in August for A1c lab work. She can not have today due to not exactly 3 months from last check due to insurance.     Patient was instructed to take medications as directed, maintain a diabetic diet, keep blood sugar log (log provided and bring to next appointment to discuss with provider, follow up PRN for blood sugars that are higher or lower values than discussed, yearly dilated diabetic eye exam and every 6 months diabetic foot exams are recommended yearly. Patient will need to return to clinic in 3 months for repeat lab work to monitor levels. Patient verbalized understanding of treatment plan and denies any questions.     Keep track of carbohydrates (sugar and starchy foods). Your blood sugar level can get too high if you eat too many carbohydrates. Your dietitian will help you plan meals and snacks that have the right amount of carbohydrates. Eat low-fat foods , such as skinless chicken and low-fat milk. Eat less sodium (salt). Limit high-sodium foods, such as soy sauce, potato chips, and soup. Do not add salt to food you cook. Limit your use of table salt. You  should have less than 2,300 mg of sodium per day. Eat high-fiber foods, such as vegetables, whole grain breads, and beans. Limit alcohol. Alcohol affects your blood sugar level and can make it harder to manage your diabetes DM exercise: Exercise can help keep your blood sugar level steady, decrease your risk of heart disease, and help you lose weight. Stretch before and after you exercise. Exercise for at least 150 minutes every week. Spread this amount of exercise over at least 3 days a week.     Look at your feet, top and bottom every morning. If you have any signs of infection, such as: reddened areas, change in feeling or temperature, swelling, blisters, or cracks in the skin, call your doctor right away. Apply lotion to dry skin areas to prevent cracks. Keep the skin between your toes clean and dry. Do not walk around without shoes on.         Relevant Medications    semaglutide (OZEMPIC) 0.25 mg or 0.5 mg (2 mg/3 mL) pen injector    Screening for thyroid disorder    Current Assessment & Plan   Increased fatigue and hair thinning. She has been under more stress the last few months at home. Will check thyroid levels today. Will call with results and any new orders.          Relevant Orders    TSH    T4, Free     Other Visit Diagnoses         Screening for diabetes mellitus                  Future Appointments   Date Time Provider Department Center   10/9/2025  4:00 PM Sanjiv Rodríguez FNP [facility]            Signature:      KELLY DE JESUS   [facility]  [address] [place] [address], [place]  [id number]       Date of encounter: 7/9/25

## 2025-07-09 NOTE — ASSESSMENT & PLAN NOTE
Increased fatigue and hair thinning. She has been under more stress the last few months at home. Will check thyroid levels today. Will call with results and any new orders.

## 2025-07-09 NOTE — ASSESSMENT & PLAN NOTE
Lab Results   Component Value Date    HGBA1C 6.5 04/30/2025    States she has been doing well with current dose of Ozempic. No side effects. Pt has lost 8lbs since starting. Fasting blood sugars running 110-115 at home. Pt is watching diet. Not exercising yet due to recent knee surgery.     Continue current dose of ozempic. Pt will come back first week in August for A1c lab work. She can not have today due to not exactly 3 months from last check due to insurance.     Patient was instructed to take medications as directed, maintain a diabetic diet, keep blood sugar log (log provided and bring to next appointment to discuss with provider, follow up PRN for blood sugars that are higher or lower values than discussed, yearly dilated diabetic eye exam and every 6 months diabetic foot exams are recommended yearly. Patient will need to return to clinic in 3 months for repeat lab work to monitor levels. Patient verbalized understanding of treatment plan and denies any questions.     Keep track of carbohydrates (sugar and starchy foods). Your blood sugar level can get too high if you eat too many carbohydrates. Your dietitian will help you plan meals and snacks that have the right amount of carbohydrates. Eat low-fat foods , such as skinless chicken and low-fat milk. Eat less sodium (salt). Limit high-sodium foods, such as soy sauce, potato chips, and soup. Do not add salt to food you cook. Limit your use of table salt. You should have less than 2,300 mg of sodium per day. Eat high-fiber foods, such as vegetables, whole grain breads, and beans. Limit alcohol. Alcohol affects your blood sugar level and can make it harder to manage your diabetes DM exercise: Exercise can help keep your blood sugar level steady, decrease your risk of heart disease, and help you lose weight. Stretch before and after you exercise. Exercise for at least 150 minutes every week. Spread this amount of exercise over at least 3 days a week.     Look at  your feet, top and bottom every morning. If you have any signs of infection, such as: reddened areas, change in feeling or temperature, swelling, blisters, or cracks in the skin, call your doctor right away. Apply lotion to dry skin areas to prevent cracks. Keep the skin between your toes clean and dry. Do not walk around without shoes on.

## 2025-09-03 DIAGNOSIS — E11.65 TYPE 2 DIABETES MELLITUS WITH HYPERGLYCEMIA, WITHOUT LONG-TERM CURRENT USE OF INSULIN: ICD-10-CM

## 2025-09-05 ENCOUNTER — TELEPHONE (OUTPATIENT)
Dept: FAMILY MEDICINE | Facility: CLINIC | Age: 60
End: 2025-09-05
Payer: COMMERCIAL

## 2025-09-05 RX ORDER — SEMAGLUTIDE 0.68 MG/ML
0.5 INJECTION, SOLUTION SUBCUTANEOUS
Qty: 3 ML | Refills: 0 | Status: SHIPPED | OUTPATIENT
Start: 2025-09-05